# Patient Record
Sex: FEMALE | Race: BLACK OR AFRICAN AMERICAN | NOT HISPANIC OR LATINO | Employment: FULL TIME | URBAN - METROPOLITAN AREA
[De-identification: names, ages, dates, MRNs, and addresses within clinical notes are randomized per-mention and may not be internally consistent; named-entity substitution may affect disease eponyms.]

---

## 2017-06-19 ENCOUNTER — OFFICE VISIT (OUTPATIENT)
Dept: OBSTETRICS AND GYNECOLOGY | Facility: CLINIC | Age: 42
End: 2017-06-19
Payer: MEDICAID

## 2017-06-19 VITALS
BODY MASS INDEX: 29.83 KG/M2 | SYSTOLIC BLOOD PRESSURE: 112 MMHG | HEIGHT: 67 IN | DIASTOLIC BLOOD PRESSURE: 70 MMHG | WEIGHT: 190.06 LBS

## 2017-06-19 DIAGNOSIS — N96 HISTORY OF MULTIPLE SPONTANEOUS ABORTIONS: Primary | ICD-10-CM

## 2017-06-19 PROCEDURE — 99999 PR PBB SHADOW E&M-EST. PATIENT-LVL II: CPT | Mod: PBBFAC,,, | Performed by: OBSTETRICS & GYNECOLOGY

## 2017-06-19 PROCEDURE — 99212 OFFICE O/P EST SF 10 MIN: CPT | Mod: PBBFAC,PO | Performed by: OBSTETRICS & GYNECOLOGY

## 2017-06-19 PROCEDURE — 99203 OFFICE O/P NEW LOW 30 MIN: CPT | Mod: S$PBB,,, | Performed by: OBSTETRICS & GYNECOLOGY

## 2017-06-19 RX ORDER — ASPIRIN 81 MG/1
81 TABLET ORAL DAILY
COMMUNITY
End: 2017-10-10

## 2017-06-20 NOTE — PROGRESS NOTES
"42 y.o.   OB History      Para Term  AB Living    7 3 3       SAB TAB Ectopic Multiple Live Births                Comlaining of: pt here to establish care and discuss frequent miss  Had 3 children by CHERRY schofield, then 4 miss, had 2 Dnc, 2 spont, no reason, told karyotype was normal    Hx of reg cycles,    PMH neg  PSH C secton/DnC  No chronic problems  Neg gyn hx, had us with miss      ROS:  GENERAL: No fever, chills, fatigability or weight loss.  SKIN: No rashes, itching or changes in color or texture of skin.  HEAD: No headaches or recent head trauma.  EYES: Visual acuity fine. No photophobia, ocular pain or diplopia.  EARS: Denies ear pain, discharge or vertigo.  NOSE: No loss of smell, no epistaxis or postnasal drip.  MOUTH & THROAT: No hoarseness or change in voice. No excessive gum bleeding.  NODES: Denies swollen glands.  CHEST: Denies GROVER, cyanosis, wheezing, cough and sputum production.  CARDIOVASCULAR: Denies chest pain, PND, orthopnea or reduced exercise tolerance.  ABDOMEN: Appetite fine. No weight loss. Denies diarrhea, abdominal pain, hematemesis or blood in stool.  URINARY: No flank pain, dysuria or hematuria.  PERIPHERAL VASCULAR: No claudication or cyanosis.  MUSCULOSKELETAL: No joint stiffness or swelling. Denies back pain.  NEUROLOGIC: No history of seizures, paralysis, alteration of gait or coordination      PE: /70   Ht 5' 7" (1.702 m)   Wt 86.2 kg (190 lb 0.6 oz)   BMI 29.76 kg/m²    Exam def    ass' frequent miss  Plan, will draw labs on day 21 this cycle, approx this Friday  Discussed in depth causes/labs, etc  Increased risk for problems being 42 but should be able to conceive          "

## 2017-06-22 ENCOUNTER — TELEPHONE (OUTPATIENT)
Dept: OBSTETRICS AND GYNECOLOGY | Facility: CLINIC | Age: 42
End: 2017-06-22

## 2017-06-22 DIAGNOSIS — N96 HABITUAL ABORTER: Primary | ICD-10-CM

## 2017-06-23 ENCOUNTER — LAB VISIT (OUTPATIENT)
Dept: LAB | Facility: HOSPITAL | Age: 42
End: 2017-06-23
Attending: OBSTETRICS & GYNECOLOGY
Payer: MEDICAID

## 2017-06-23 DIAGNOSIS — N96 HABITUAL ABORTER: ICD-10-CM

## 2017-06-23 LAB
FSH SERPL-ACNC: 3.3 MIU/ML
PROGEST SERPL-MCNC: 11.2 NG/ML
TSH SERPL DL<=0.005 MIU/L-ACNC: 1.92 UIU/ML

## 2017-06-23 PROCEDURE — 84443 ASSAY THYROID STIM HORMONE: CPT

## 2017-06-23 PROCEDURE — 86147 CARDIOLIPIN ANTIBODY EA IG: CPT | Mod: 59

## 2017-06-23 PROCEDURE — 84144 ASSAY OF PROGESTERONE: CPT

## 2017-06-23 PROCEDURE — 36415 COLL VENOUS BLD VENIPUNCTURE: CPT

## 2017-06-23 PROCEDURE — 85305 CLOT INHIBIT PROT S TOTAL: CPT

## 2017-06-23 PROCEDURE — 86038 ANTINUCLEAR ANTIBODIES: CPT

## 2017-06-23 PROCEDURE — 83001 ASSAY OF GONADOTROPIN (FSH): CPT

## 2017-06-26 LAB — ANA SER QL IF: NORMAL

## 2017-06-27 LAB
CARDIOLIPIN IGG SER IA-ACNC: <9.4 GPL
CARDIOLIPIN IGM SER IA-ACNC: 16.95 MPL

## 2017-06-29 LAB — PROT S ACT/NOR PPP: 73 %

## 2017-07-31 ENCOUNTER — TELEPHONE (OUTPATIENT)
Dept: OBSTETRICS AND GYNECOLOGY | Facility: CLINIC | Age: 42
End: 2017-07-31

## 2017-07-31 DIAGNOSIS — N96 HABITUAL ABORTER NOT CURRENTLY PREGNANT: Primary | ICD-10-CM

## 2017-07-31 NOTE — TELEPHONE ENCOUNTER
----- Message from Michael A. Wiedemann, MD sent at 7/28/2017  6:03 PM CDT -----  She works on post partum, nurse aid,  She has had 4 misscarriages in a row. (has 3 prev children)  Anyway, I did some labs and all are ok, I spoke to her about it.  Can you set her up with one of the [facility] peeps to discuss other testing??  Maybe see who likes this..Hannah??   And she can make appt,    thanks

## 2017-08-22 ENCOUNTER — INITIAL CONSULT (OUTPATIENT)
Dept: MATERNAL FETAL MEDICINE | Facility: CLINIC | Age: 42
End: 2017-08-22
Attending: OBSTETRICS & GYNECOLOGY
Payer: COMMERCIAL

## 2017-08-22 DIAGNOSIS — N96 RECURRENT PREGNANCY LOSS WITHOUT CURRENT PREGNANCY: Primary | ICD-10-CM

## 2017-08-22 PROCEDURE — 99242 OFF/OP CONSLTJ NEW/EST SF 20: CPT | Mod: S$GLB,,, | Performed by: OBSTETRICS & GYNECOLOGY

## 2017-08-22 PROCEDURE — 99999 PR PBB SHADOW E&M-EST. PATIENT-LVL I: CPT | Mod: PBBFAC,,, | Performed by: OBSTETRICS & GYNECOLOGY

## 2017-08-22 NOTE — LETTER
August 25, 2017      Michael A. Wiedemann, MD  [address] [place] [address]           [facility]  [address]  [facility] [address]  Phone: [phone]          Patient: Gladis Sesay   MR Number: [id number]   YOB: 1975   Date of Visit: 8/22/2017       Dear Dr. Michael A. Wiedemann:    Thank you for referring Gladis Sesay to me for evaluation. Attached you will find relevant portions of my assessment and plan of care.    If you have questions, please do not hesitate to call me. I look forward to following Gladis Sesay along with you.    Sincerely,    Melissa Morales  CC:  No Recipients    If you would like to receive this communication electronically, please contact [email or web address] or [phone] to request more information on [facility] [employer] access.    For providers and/or their staff who would like to refer a patient to [facility], please contact us through our one-stop-shop provider referral line, [facility], at [phone].    If you feel you have received this communication in error or would no longer like to receive these types of communications, please e-mail [email or web address]

## 2017-08-29 NOTE — PROGRESS NOTES
Ms. Nikki Sesay is a 41y/o  who presents for preconception counseling due to a history of recurrent pregnancy loss.    PMHx: Glaucoma-since age 2-well controlled-father's side has              She reported clotting easily had a negative workup through her PCP    PSHx: D and C             Csectionx3    Meds: None, not taking PNV (not currently sexually active), not taking baby ASA    NKDA    Family history: glaucoma, no history of birth defects, no history of sickle cell disease    POBHx: 2004-Term csection 7 pounds 10 ounces                2008-Term csection  10 pounds                2011-Term csection 8 pounds                2013-8 weeks D and C, did see fetus without cardiac activity                -blighted ovum, no D and C                -ultrasound gestational sac/no pole-patient reports passed pregnancy quickly                2017-no cardiac activity, 7-8 weeks-D and C-concern was molar pregnancy per patient but reportedly chromosomes were normal although no records                No reported complications with term deliveries    Social: Denies smoking, alcohol, illicit drug use    TRESA negative, ACLA IgM indeterminate, Protein S activity normal, TSH 1.918    /84    1. Recurrent pregnancy loss:     The patient was counseled about the frequency of misscarriage.  The majority of cases of first trimester miscarriages are the result of genetic problems or fetal aneuploidy in the pregnancy.  Other etiologies may include congenital anomalies of the fetus, infection, bleeding, abruption, thrombophilias or uterine anomalies.         The patient's anticardiolipin antibodies are indeterminate and technically do not meet the criteria for APLS but she has not had the full workup. I would recommend rechecking the ACLA IgG and IgM approximately 12 weeks from the last draw to see if there are any changes. I would also recommend checking lupus anticoagulant and beta 2 glycoprotein IgG and IgM  antibodies. I discussed with the patient the option of maternal and paternal karyotype to assess for balanced translocations which she will consider and these could be drawn at the time of her other lab studies. I would also recommend screening the patient for diabetes (2 hour GTT could be used and hemoglobin A1c). Some individuals also suggested checking thyroglobulin antibodies and thyroid peroxidase antibodies. She did not report a history of GDM but did have a large baby in the past. To assess for uterine anomalies particularly after multiple csections and D and C recommend checking an HSG/Sonohysterogram. It is possible that women can develop scar tissue/Asherman syndrome as well. I would also check prolactin.   It may be beneficial if these studies do not elucidate an etiology for the patient to see a [facility] physician as some individuals have luteal phase defects etc.    I did discuss with the patient the increased frequency of miscarriage with advancing age and the increased risk of aneuploidy. We do not have information on the products of conception from prior pregnancies. I would recommend that you obtain the records from her last pregnancy loss to be sure there are no concerns.    Given her borderline ACLA, I would at least recommend a daily baby aspirin 81mg.    You should also confirm the patient's HIV, rubella, and varicella status and give precautions regarding fish and alcohol consumption, as well as appropriate exercise and weight.    If conception is being attempted recommend prenatal vitamin with appropriate folic acid supplementation.    2. AMA      We discussed that AMA increases the risk of aneuploidy. We discussed if conception occurs the options of sequential screening and NIPT. We also discussed diagnostic testing via       CVS and amniocentesis. We discussed the increased risk of adverse outcomes with AMA including but not limited to diabetes, hypertension, miscarriage and fetal demise.  Detailed       Anatomic survey is recommended at 20 weeks, as is a growth ultrasound at 32 weeks.    3. Multiple csections and uterine procedures increase the risk of abnormal placentation such as placenta accreta etc. Additionally, there can be increased scar tissue with increased procedures.    4. The patient has a strong family history of glaucoma and based on what she reports it sounds dominant. She is aware of the potential for her children to be affected and her children are being followed.    5. Recommend hemoglobin electrophoresis.    6. If these studies do not elucidate an etiology, some individuals pursue additional screening with advanced carrier screening when contemplating pregnancy, but this may not elucidate an etiology either.    I have not scheduled the patient for a return visit. She is uncertain if she will pursue further pregnancies. However, if you obtain these studies we would be happy to see the patient in follow up to review the results and discuss any further recommendations.    A total of 30 minutes was spent in face to face time with greater than half of that time spent in counseling and coordination of care.

## 2017-09-19 ENCOUNTER — TELEPHONE (OUTPATIENT)
Dept: OBSTETRICS AND GYNECOLOGY | Facility: CLINIC | Age: 42
End: 2017-09-19

## 2017-09-19 NOTE — TELEPHONE ENCOUNTER
----- Message from Kimimicah Gore sent at 9/19/2017 10:52 AM CDT -----  Contact: [phone] / self   Patient is currently scheduled on 10/10/17 but is requesting something sooner. States she went to see the doctor at [facility] and has done all the other instructions given to her by Dr. Wiedemann and now wants to follow up with him. Please advise.

## 2017-09-20 NOTE — TELEPHONE ENCOUNTER
I see the high risk doctor ordered more labs,,i can' order those and see first if she wants?  Does she need a pap /annual exam?

## 2017-09-20 NOTE — TELEPHONE ENCOUNTER
Pt states she had a pap in June. She also said she wanted to discuss the labs that were ordered with you because she doesn't know why they were ordered. She has 3 children already and doesn't know why the genetic tests were ordered. She has 3 healthy children and all of a sudden she had miscarriages

## 2017-09-27 NOTE — TELEPHONE ENCOUNTER
The tests were ordered because she wanted to find out if there was something wrong as to why she had so many misscarriages.   ??

## 2017-10-10 ENCOUNTER — OFFICE VISIT (OUTPATIENT)
Dept: OBSTETRICS AND GYNECOLOGY | Facility: CLINIC | Age: 42
End: 2017-10-10
Payer: COMMERCIAL

## 2017-10-10 VITALS
WEIGHT: 183 LBS | BODY MASS INDEX: 28.72 KG/M2 | SYSTOLIC BLOOD PRESSURE: 106 MMHG | HEIGHT: 67 IN | DIASTOLIC BLOOD PRESSURE: 74 MMHG

## 2017-10-10 DIAGNOSIS — Z30.09 GENERAL COUNSELING AND ADVICE ON FEMALE CONTRACEPTION: Primary | ICD-10-CM

## 2017-10-10 PROCEDURE — 99212 OFFICE O/P EST SF 10 MIN: CPT | Mod: S$GLB,,, | Performed by: OBSTETRICS & GYNECOLOGY

## 2017-10-10 PROCEDURE — 99999 PR PBB SHADOW E&M-EST. PATIENT-LVL II: CPT | Mod: PBBFAC,,, | Performed by: OBSTETRICS & GYNECOLOGY

## 2017-10-10 NOTE — PROGRESS NOTES
"42 y.o.   OB History      Para Term  AB Living    7 3 3   4 3    SAB TAB Ectopic Multiple Live Births    4       3        Comlaining of: pt here for fu of prev miss x 4 and labs  Not on ocp  Cycles regular, saw MFM also      ROS:  GENERAL: No fever, chills, fatigability or weight loss.  SKIN: No rashes, itching or changes in color or texture of skin.  HEAD: No headaches or recent head trauma.  EYES: Visual acuity fine. No photophobia, ocular pain or diplopia.  EARS: Denies ear pain, discharge or vertigo.  NOSE: No loss of smell, no epistaxis or postnasal drip.  MOUTH & THROAT: No hoarseness or change in voice. No excessive gum bleeding.  NODES: Denies swollen glands.  CHEST: Denies GROVER, cyanosis, wheezing, cough and sputum production.  CARDIOVASCULAR: Denies chest pain, PND, orthopnea or reduced exercise tolerance.  ABDOMEN: Appetite fine. No weight loss. Denies diarrhea, abdominal pain, hematemesis or blood in stool.  URINARY: No flank pain, dysuria or hematuria.  PERIPHERAL VASCULAR: No claudication or cyanosis.  MUSCULOSKELETAL: No joint stiffness or swelling. Denies back pain.  NEUROLOGIC: No history of seizures, paralysis, alteration of gait or coordination      PE: /74   Ht 5' 7" (1.702 m)   Wt 83 kg (182 lb 15.7 oz)   LMP 2017 (Exact Date)   BMI 28.66 kg/m²      Exam def  Discussed options of cont testing, vs observation  Pt graduating school Dec, then wants to cont educationswhile working  Has come to conclusioin of getting on birth control for now  If decides to get pregnant, will contact about doing more labs possibley  Discussed age factor    Pt wants to start ocp  Rx given for lolo with coupon  Fu for pap or prn        "

## 2017-10-16 RX ORDER — NORETHINDRONE ACETATE AND ETHINYL ESTRADIOL .02; 1 MG/1; MG/1
1 TABLET ORAL DAILY
Qty: 30 TABLET | Refills: 11 | Status: SHIPPED | OUTPATIENT
Start: 2017-10-16 | End: 2018-11-12

## 2017-10-16 NOTE — TELEPHONE ENCOUNTER
----- Message from Zahra Morales sent at 10/16/2017  4:02 PM CDT -----  Contact: Self/ [phone]  Patient would like to speak with you about getting a medication change. Please advise.

## 2017-11-10 ENCOUNTER — OFFICE VISIT (OUTPATIENT)
Dept: URGENT CARE | Facility: CLINIC | Age: 42
End: 2017-11-10
Payer: COMMERCIAL

## 2017-11-10 ENCOUNTER — HOSPITAL ENCOUNTER (EMERGENCY)
Facility: HOSPITAL | Age: 42
Discharge: HOME | End: 2017-11-10
Attending: EMERGENCY MEDICINE
Payer: COMMERCIAL

## 2017-11-10 VITALS
DIASTOLIC BLOOD PRESSURE: 84 MMHG | OXYGEN SATURATION: 100 % | HEART RATE: 70 BPM | WEIGHT: 182 LBS | TEMPERATURE: 98 F | BODY MASS INDEX: 28.56 KG/M2 | RESPIRATION RATE: 18 BRPM | SYSTOLIC BLOOD PRESSURE: 138 MMHG | HEIGHT: 67 IN

## 2017-11-10 VITALS
DIASTOLIC BLOOD PRESSURE: 59 MMHG | RESPIRATION RATE: 18 BRPM | WEIGHT: 182 LBS | OXYGEN SATURATION: 100 % | SYSTOLIC BLOOD PRESSURE: 128 MMHG | HEART RATE: 85 BPM | HEIGHT: 67 IN | BODY MASS INDEX: 28.56 KG/M2

## 2017-11-10 DIAGNOSIS — J39.2 SPASM OF THROAT: Primary | ICD-10-CM

## 2017-11-10 DIAGNOSIS — J98.01 BRONCHOSPASM: ICD-10-CM

## 2017-11-10 DIAGNOSIS — R06.00 DYSPNEA, UNSPECIFIED TYPE: Primary | ICD-10-CM

## 2017-11-10 DIAGNOSIS — R52 PAIN: ICD-10-CM

## 2017-11-10 DIAGNOSIS — R06.02 SHORTNESS OF BREATH: ICD-10-CM

## 2017-11-10 LAB
B-HCG UR QL: NEGATIVE
CTP QC/QA: YES

## 2017-11-10 PROCEDURE — 99284 EMERGENCY DEPT VISIT MOD MDM: CPT | Mod: 25

## 2017-11-10 PROCEDURE — 81025 URINE PREGNANCY TEST: CPT | Performed by: PHYSICIAN ASSISTANT

## 2017-11-10 PROCEDURE — 99214 OFFICE O/P EST MOD 30 MIN: CPT | Mod: S$GLB,,, | Performed by: NURSE PRACTITIONER

## 2017-11-10 PROCEDURE — 96372 THER/PROPH/DIAG INJ SC/IM: CPT

## 2017-11-10 PROCEDURE — 63600175 PHARM REV CODE 636 W HCPCS: Performed by: PHYSICIAN ASSISTANT

## 2017-11-10 RX ORDER — DEXAMETHASONE SODIUM PHOSPHATE 4 MG/ML
8 INJECTION, SOLUTION INTRA-ARTICULAR; INTRALESIONAL; INTRAMUSCULAR; INTRAVENOUS; SOFT TISSUE
Status: COMPLETED | OUTPATIENT
Start: 2017-11-10 | End: 2017-11-10

## 2017-11-10 RX ADMIN — DEXAMETHASONE SODIUM PHOSPHATE 8 MG: 4 INJECTION, SOLUTION INTRAMUSCULAR; INTRAVENOUS at 08:11

## 2017-11-10 NOTE — PROGRESS NOTES
"Subjective:       Patient ID: Gladis Sesay is a 42 y.o. female.    Vitals:  height is 5' 7" (1.702 m) and weight is 82.6 kg (182 lb). Her temperature is 97.8 °F (36.6 °C). Her blood pressure is 138/84 and her pulse is 70. Her respiration is 18 and oxygen saturation is 100%.     Chief Complaint: Shortness of Breath    Shortness of breath and bronchospasm after drinking coffee this morning. No SOB at current but reports dry mouth. Denies fever, cough, chest pain.      Shortness of Breath   This is a new problem. The current episode started today. Episode frequency: 4 times today. The problem has been waxing and waning. Pertinent negatives include no abdominal pain, chest pain, ear pain, fever, headaches, leg swelling, sore throat, sputum production or wheezing. Nothing aggravates the symptoms. Risk factors: none. She has tried nothing for the symptoms. The treatment provided no relief. There is no history of asthma, bronchiolitis, COPD or pneumonia.     Review of Systems   Constitution: Negative for chills, fever and malaise/fatigue.   HENT: Negative for congestion, ear pain, hoarse voice and sore throat.    Eyes: Negative for discharge and redness.   Cardiovascular: Negative for chest pain, dyspnea on exertion and leg swelling.   Respiratory: Positive for shortness of breath. Negative for cough, sputum production and wheezing.    Musculoskeletal: Negative for myalgias.   Gastrointestinal: Negative for abdominal pain and nausea.   Neurological: Negative for headaches.   All other systems reviewed and are negative.      Objective:      Physical Exam   Constitutional: She is oriented to person, place, and time. She appears well-developed and well-nourished.   HENT:   Head: Normocephalic and atraumatic.   Right Ear: Hearing, tympanic membrane, external ear and ear canal normal. Tympanic membrane is not erythematous. No decreased hearing is noted.   Left Ear: Hearing, tympanic membrane, external ear and ear " canal normal. Tympanic membrane is not erythematous. No decreased hearing is noted.   Nose: Nose normal. No mucosal edema or rhinorrhea. Right sinus exhibits no maxillary sinus tenderness and no frontal sinus tenderness. Left sinus exhibits no maxillary sinus tenderness and no frontal sinus tenderness.   Mouth/Throat: Uvula is midline, oropharynx is clear and moist and mucous membranes are normal. No oropharyngeal exudate, posterior oropharyngeal edema or posterior oropharyngeal erythema.   Eyes: Conjunctivae, EOM and lids are normal.   Neck: Trachea normal, normal range of motion and full passive range of motion without pain. Neck supple.   Cardiovascular: Normal rate, regular rhythm, S1 normal, S2 normal and normal heart sounds.    Pulmonary/Chest: Effort normal and breath sounds normal. No respiratory distress.   Abdominal: Soft. Normal appearance.   Neurological: She is alert and oriented to person, place, and time. She has normal strength. No cranial nerve deficit or sensory deficit. GCS eye subscore is 4. GCS verbal subscore is 5. GCS motor subscore is 6.   Skin: Skin is warm and dry. No rash noted.   Nursing note and vitals reviewed.      Assessment:       1. Dyspnea, unspecified type    2. Shortness of breath    3. Bronchospasm        Plan:         Dyspnea, unspecified type    Shortness of breath  -     Refer to Emergency Dept.    Bronchospasm        Patient declined chest xray in clinic. Referred to ER for further work up

## 2017-11-11 NOTE — ED PROVIDER NOTES
Encounter Date: 11/10/2017       History     Chief Complaint   Patient presents with    throat spasm?     pt sent here from urgent care for throat spasm, denies sore throat or fever, no sob. spasm starts , pt feels like cant breathe then it releases and everything is fine, started occurring at 10am     42-year-old female with  past medical history glaucoma presents to the ED for evaluation of reported throat spasm.  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  She states that she typically only has decaf coffee however today drank regular coffee this morning.  She states several minutes following this she had what felt like a throat spasm.  She states that this did start all her and that it made her quite anxious and that she had some secondary hyperventilation and shortness of breath that resolved after concentrating on deep breathing.  Denies any prolonged spasm and states that she had approximately 4 more episodes throughout the day similar to the first one.  She does report that she is tolerating liquids and solids by mouth and that she ate something shortly before arrival to the ED.  She does complain of some associated dry mouth. She did go to an urgent care just prior to arrival and was told they wanted to do a CAT scan of the neck and patient did not feel comfortable with this and elected to leave.  I do see a note from urgent care sending patient to ED for further evaluation of a PE.  Patient denies any chest pain, shortness of breath, palpitations, hemoptysis, known malignancy, lower leg edema or prolonged travel or trauma.       The history is provided by the patient.     Review of patient's allergies indicates:  No Known Allergies  Past Medical History:   Diagnosis Date    Glaucoma      Past Surgical History:   Procedure Laterality Date     SECTION      DILATION AND CURETTAGE OF UTERUS       History reviewed. No pertinent family  history.  Social History   Substance Use Topics    Smoking status: Never Smoker    Smokeless tobacco: Never Used    Alcohol use No     Review of Systems   Constitutional: Negative for chills and fever.   HENT: Positive for trouble swallowing and voice change. Negative for congestion, postnasal drip, rhinorrhea, sinus pain and sore throat.    Respiratory: Positive for shortness of breath (Episodic and denies presently). Negative for cough, choking, chest tightness, wheezing and stridor.    Cardiovascular: Negative for chest pain, palpitations and leg swelling.   Gastrointestinal: Negative for nausea.   Genitourinary: Negative for dysuria.   Musculoskeletal: Negative for back pain.   Skin: Negative for rash.   Neurological: Negative for dizziness, syncope, weakness, light-headedness and headaches.   Hematological: Does not bruise/bleed easily.   Psychiatric/Behavioral: The patient is nervous/anxious.        Physical Exam     Initial Vitals [11/10/17 1825]   BP Pulse Resp Temp SpO2   (!) 146/72 61 18 -- 100 %      MAP       96.67         Physical Exam    Nursing note and vitals reviewed.  Constitutional: Vital signs are normal. She appears well-developed and well-nourished. She is cooperative.  Non-toxic appearance. She does not appear ill. No distress.   HENT:   Head: Normocephalic and atraumatic.   Nose: Nose normal.   Mouth/Throat: Uvula is midline. Mucous membranes are dry. No trismus in the jaw. No uvula swelling. No posterior oropharyngeal edema, posterior oropharyngeal erythema or tonsillar abscesses.   Neck is supple with no tracheal deviation, stridor or signs of compromised airway.  She does have noted hoarse voice however no hot potato voice, trismus and is tolerating secretions   Eyes: Conjunctivae and lids are normal.   Neck: Neck supple. No stridor present. No neck rigidity.   Cardiovascular: Normal rate and regular rhythm.   Pulmonary/Chest: Breath sounds normal. No respiratory distress. She has no  wheezes. She has no rhonchi.   Abdominal: Soft. Normal appearance and bowel sounds are normal. There is no tenderness. There is no rigidity and no guarding.   Musculoskeletal: Normal range of motion.   Neurological: She is alert and oriented to person, place, and time. GCS eye subscore is 4. GCS verbal subscore is 5. GCS motor subscore is 6.   Skin: Skin is warm, dry and intact. No rash noted.   Psychiatric: Her speech is normal and behavior is normal. Thought content normal. Her mood appears anxious.         ED Course   Procedures  Labs Reviewed - No data to display      Imaging Results          X-Ray Neck Soft Tissue (Final result)  Result time 11/10/17 20:02:56    Final result by Dov Arthur MD (11/10/17 20:02:56)                 Impression:     No significant abnormality      Electronically signed by: DOV ARTHUR MD  Date:     11/10/17  Time:    20:02              Narrative:    Neck AP and lateral soft tissue views. No prior exam.    The outline of upper airway is unremarkable. No swelling of the epiglottis is identified. The prevertebral soft tissues are normal in width. No foreign body is detected. Cervical vertebra are intact.                                   Medical Decision Making:   Initial Assessment:   42-year-old well-appearing female presents to the ED for evaluation of episodic throat spasms that began today.  Patient states that she did have caffeinated coffee which is atypical for her but denies any other new medication are environmental exposures.  She states that the sensation is brief and typically resolves however it does cause her some anxiety immediately following.  Scribe some shortness of breath associated with the anxious episodes however denies any presently, denies any dyspnea on exertion or NPD.  She has not tried any medication for the symptoms.  Physical exam reveals well-appearing female in no obvious distress although should she does appear mildly anxious when she  discusses the spasms. Neck is supple with no tracheal deviation, stridor or signs of compromised airway.  Differential Diagnosis:   Epiglottitis, laryngitis, pharyngitis, cricoid abnormality, stress reaction  Clinical Tests:   Radiological Study: Ordered and Reviewed  ED Management:  Patient has stable vitals and appears in no obvious distress with no signs of airway compromise or respiratory distress on physical exam.  A soft tissue neck x-ray reveals no abnormalities.  I do note that patient was sent to the ED for further evaluation or PE rule out however patient is a Wells urticaria of 0 and has no complaints of shortness of breath at this time with stable vitals.  I do feel the episodic shortness of breath was related to the anxiety patient was experiencing after reported throat spasms.  Patient's mucous membranes are noted to be dry however she is tolerating secretions, food and liquids by mouth and demonstrated successful by mouth challenge in the ED.  Patient was instructed to increase oral hydration. Unclear etiology of patient's symptoms however it does not appear to be an emergent process at this time. Decadron was ordered to help reduce any inflammation.  Patient was instructed to follow-up with ear nose and throat doctor.  Resources provided to patient. Patient was cautioned on when to return to ED. Patient verbalized understanding and agreement with the treatment plan.      Additional MDM:     Well's Criteria Score:  -Clinical symptoms of DVT (leg swelling, pain with palpation) = 0.0  -Other diagnosis less likely than pulmonary embolism =            0.0  -Heart Rate >100 =   0.0  -Previous DVT/PE = 0.0  -Hemoptysis =          0.0  -Malignancy =           0.0                        ED Course      Clinical Impression:   The primary encounter diagnosis was Spasm of throat. A diagnosis of Pain was also pertinent to this visit.                           GARY Montenegro  11/10/17 2037

## 2017-11-11 NOTE — ED NOTES
Patient has verified the spelling of their name and  on armband  LOC: The patient is awake, alert, and aware of environment with an appropriate affect, the patient is oriented x 4 and speaking appropriately, voice hoarse.   APPEARANCE: Patient resting comfortably and in no acute distress, patient is clean and well groomed, patient's clothing is properly fastened.   SKIN: The skin is warm and dry, color consistent with ethnicity, patient has normal skin turgor and moist mucus membranes, skin intact, no breakdown or bruising noted.   : Voids without difficulty  MUSCULOSKELETAL: Patient moving all extremities spontaneously, no obvious swelling or deformities noted.   RESPIRATORY: Airway is open and patent, respirations are spontaneous, patient has a normal effort and rate, no accessory muscle use noted, bilateral breath sounds clear, complaints of  SOB , )2 sats at 100% room air, HR 67, complaints of dry throat  ABDOMEN: Soft and non tender to palpation, no distention noted, normoactive bowel sounds present in all four quadrants.   CARDIAC: Normal rate and rhythm, no peripheral edema noted, less then 3 second capillary refill, denies chest pain  COMPLAINT: throat spasm on today after drinking coffee, pt states it felt like her throat spasm and had problem catching her breath

## 2018-07-31 ENCOUNTER — OFFICE VISIT (OUTPATIENT)
Dept: OBSTETRICS AND GYNECOLOGY | Facility: CLINIC | Age: 43
End: 2018-07-31
Payer: COMMERCIAL

## 2018-07-31 VITALS
BODY MASS INDEX: 27.27 KG/M2 | WEIGHT: 173.75 LBS | HEIGHT: 67 IN | SYSTOLIC BLOOD PRESSURE: 108 MMHG | DIASTOLIC BLOOD PRESSURE: 72 MMHG

## 2018-07-31 DIAGNOSIS — Z12.31 SCREENING MAMMOGRAM, ENCOUNTER FOR: ICD-10-CM

## 2018-07-31 DIAGNOSIS — Z12.4 ROUTINE PAPANICOLAOU SMEAR: ICD-10-CM

## 2018-07-31 DIAGNOSIS — Z01.419 WELL WOMAN EXAM WITH ROUTINE GYNECOLOGICAL EXAM: Primary | ICD-10-CM

## 2018-07-31 PROCEDURE — 88141 CYTOPATH C/V INTERPRET: CPT | Mod: ,,, | Performed by: PATHOLOGY

## 2018-07-31 PROCEDURE — 99999 PR PBB SHADOW E&M-EST. PATIENT-LVL III: CPT | Mod: PBBFAC,,, | Performed by: OBSTETRICS & GYNECOLOGY

## 2018-07-31 PROCEDURE — 88175 CYTOPATH C/V AUTO FLUID REDO: CPT | Performed by: PATHOLOGY

## 2018-07-31 PROCEDURE — 99396 PREV VISIT EST AGE 40-64: CPT | Mod: S$GLB,,, | Performed by: OBSTETRICS & GYNECOLOGY

## 2018-07-31 RX ORDER — DROSPIRENONE AND ETHINYL ESTRADIOL 0.02-3(28)
1 KIT ORAL DAILY
Qty: 30 TABLET | Refills: 11 | Status: SHIPPED | OUTPATIENT
Start: 2018-07-31 | End: 2018-11-12

## 2018-07-31 NOTE — PROGRESS NOTES
"HPI:   43 y.o.   OB History      Para Term  AB Living    7 3 3   4 3    SAB TAB Ectopic Multiple Live Births    4       3       Patient's last menstrual period was 2018 (approximate).    Patient is  here for her annual gynecologic exam.  She has no complaints.     ROS:  GENERAL: No fever, chills, fatigability or weight loss.  SKIN: No rashes, itching or changes in color or texture of skin.  HEAD: No headaches or recent head trauma.  EYES: Visual acuity fine. No photophobia, ocular pain or diplopia.  EARS: Denies ear pain, discharge or vertigo.  NOSE: No loss of smell, no epistaxis or postnasal drip.  MOUTH & THROAT: No hoarseness or change in voice. No excessive gum bleeding.  NODES: Denies swollen glands.  CHEST: Denies GROVER, cyanosis, wheezing, cough and sputum production.  CARDIOVASCULAR: Denies chest pain, PND, orthopnea or reduced exercise tolerance.  ABDOMEN: Appetite fine. No weight loss. Denies diarrhea, abdominal pain, hematemesis or blood in stool.  URINARY: No flank pain, dysuria or hematuria.  PERIPHERAL VASCULAR: No claudication or cyanosis.  MUSCULOSKELETAL: No joint stiffness or swelling. Denies back pain.  NEUROLOGIC: No history of seizures, paralysis, alteration of gait or coordination.    PE:   /72   Ht 5' 7" (1.702 m)   Wt 78.8 kg (173 lb 11.6 oz)   LMP 2018 (Approximate)   BMI 27.21 kg/m²   APPEARANCE: Well nourished, well developed, in no acute distress.  NECK: Neck symmetric without masses or thyromegaly.  BREASTS: Symmetrical, no skin changes or visible lesions. No palpable masses, nipple discharge or adenopathy bilaterally.  ABDOMEN: Flat. Soft. No tenderness or masses. No hepatosplenomegaly. No hernias. No CVA tenderness.  VULVA: No lesions. Normal female genitalia.  URETHRAL MEATUS: Normal size and location, no lesions, no prolapse.  URETHRA: No masses, tenderness, prolapse or scarring.  VAGINA: Moist and well rugated, no discharge, no significant " cystocele or rectocele.  CERVIX: No lesions and discharge. PAP done.  UTERUS: Normal size, regular shape, mobile, non-tender, bladder base nontender.  ADNEXA: No masses, tenderness or CDS nodularity.  ANUS PERINEUM: Normal.    PROCEDURES:  Pap smear    Assessment:  Normal Gynecologic Exam    Plan:  Mammogram and Colonoscopy if indicated by current recommendations.  Return to clinic in one year or for any problems or complaints.  Change pill for increase facial hair

## 2018-08-06 ENCOUNTER — TELEPHONE (OUTPATIENT)
Dept: OBSTETRICS AND GYNECOLOGY | Facility: CLINIC | Age: 43
End: 2018-08-06

## 2018-08-06 NOTE — TELEPHONE ENCOUNTER
----- Message from Shekhar Steward sent at 8/6/2018  2:02 PM CDT -----  Contact: [phone]  Patient advised her insurance will not cover the Rx for the Loryna. Please call and advise.

## 2018-08-07 RX ORDER — LEVONORGESTREL AND ETHINYL ESTRADIOL 0.1-0.02MG
1 KIT ORAL DAILY
Qty: 28 TABLET | Refills: 11 | Status: SHIPPED | OUTPATIENT
Start: 2018-08-07 | End: 2018-11-12

## 2018-08-09 ENCOUNTER — HOSPITAL ENCOUNTER (OUTPATIENT)
Dept: RADIOLOGY | Facility: HOSPITAL | Age: 43
Discharge: HOME | End: 2018-08-09
Attending: OBSTETRICS & GYNECOLOGY
Payer: COMMERCIAL

## 2018-08-09 DIAGNOSIS — Z12.31 SCREENING MAMMOGRAM, ENCOUNTER FOR: ICD-10-CM

## 2018-08-09 PROCEDURE — 77067 SCR MAMMO BI INCL CAD: CPT | Mod: TC

## 2018-08-09 PROCEDURE — 77063 BREAST TOMOSYNTHESIS BI: CPT | Mod: 26,,, | Performed by: RADIOLOGY

## 2018-08-09 PROCEDURE — 77067 SCR MAMMO BI INCL CAD: CPT | Mod: 26,,, | Performed by: RADIOLOGY

## 2018-08-22 ENCOUNTER — TELEPHONE (OUTPATIENT)
Dept: OBSTETRICS AND GYNECOLOGY | Facility: CLINIC | Age: 43
End: 2018-08-22

## 2018-08-22 NOTE — TELEPHONE ENCOUNTER
----- Message from Shekhar Steward sent at 8/21/2018  4:26 PM CDT -----  Contact: [phone]  Patient advised her insurance will not cover the levonorgestrel-ethinyl estradiol (AVIANE,DARREN,LESSINA) 0.1-20 mg-mcg per tablet and need a new birth control. Please call.

## 2018-08-22 NOTE — TELEPHONE ENCOUNTER
Left message to return call  Pt needs to call her insurance company and find out which birth control pills are covered.

## 2018-09-06 ENCOUNTER — TELEPHONE (OUTPATIENT)
Dept: OBSTETRICS AND GYNECOLOGY | Facility: CLINIC | Age: 43
End: 2018-09-06

## 2018-09-06 NOTE — TELEPHONE ENCOUNTER
----- Message from Elsy Dnun sent at 9/6/2018  7:56 AM CDT -----  Contact: [phone]/self  Patient called in returning your call. Please advise.

## 2018-10-08 ENCOUNTER — LAB VISIT (OUTPATIENT)
Dept: LAB | Facility: HOSPITAL | Age: 43
End: 2018-10-08
Attending: OBSTETRICS & GYNECOLOGY
Payer: COMMERCIAL

## 2018-10-08 ENCOUNTER — TELEPHONE (OUTPATIENT)
Dept: OBSTETRICS AND GYNECOLOGY | Facility: CLINIC | Age: 43
End: 2018-10-08

## 2018-10-08 DIAGNOSIS — N91.2 AMENORRHEA: Primary | ICD-10-CM

## 2018-10-08 DIAGNOSIS — N91.2 AMENORRHEA: ICD-10-CM

## 2018-10-08 DIAGNOSIS — Z34.91 CURRENTLY PREGNANT IN FIRST TRIMESTER WITH UNKNOWN GESTATIONAL AGE: Primary | ICD-10-CM

## 2018-10-08 LAB — HCG INTACT+B SERPL-ACNC: NORMAL MIU/ML

## 2018-10-08 PROCEDURE — 84702 CHORIONIC GONADOTROPIN TEST: CPT

## 2018-10-08 PROCEDURE — 36415 COLL VENOUS BLD VENIPUNCTURE: CPT

## 2018-10-08 NOTE — TELEPHONE ENCOUNTER
Pt states she missed her pills and she had a positive home pregnancy test. She will go for HCG today. She doesn't remember her LMP

## 2018-10-08 NOTE — TELEPHONE ENCOUNTER
----- Message from Donny Lagunas sent at 10/8/2018  9:07 AM CDT -----  Contact: [phone]/self  Pt requesting to be seen today for birth control issues (pt did not care to elaborate)  Please call and advise

## 2018-10-10 ENCOUNTER — PROCEDURE VISIT (OUTPATIENT)
Dept: OBSTETRICS AND GYNECOLOGY | Facility: CLINIC | Age: 43
End: 2018-10-10
Payer: COMMERCIAL

## 2018-10-10 ENCOUNTER — TELEPHONE (OUTPATIENT)
Dept: OBSTETRICS AND GYNECOLOGY | Facility: CLINIC | Age: 43
End: 2018-10-10

## 2018-10-10 DIAGNOSIS — Z34.91 CURRENTLY PREGNANT IN FIRST TRIMESTER WITH UNKNOWN GESTATIONAL AGE: ICD-10-CM

## 2018-10-10 DIAGNOSIS — N91.2 AMENORRHEA: Primary | ICD-10-CM

## 2018-10-10 PROCEDURE — 76801 OB US < 14 WKS SINGLE FETUS: CPT | Mod: S$GLB,,, | Performed by: OBSTETRICS & GYNECOLOGY

## 2018-10-10 NOTE — TELEPHONE ENCOUNTER
----- Message from Estefania Burk sent at 10/10/2018  4:35 PM CDT -----  Contact: [phone]/self  Patient requesting to speak with you regarding scheduling a follow up appt.

## 2018-10-11 ENCOUNTER — PATIENT MESSAGE (OUTPATIENT)
Dept: OBSTETRICS AND GYNECOLOGY | Facility: CLINIC | Age: 43
End: 2018-10-11

## 2018-10-17 ENCOUNTER — LAB VISIT (OUTPATIENT)
Dept: LAB | Facility: HOSPITAL | Age: 43
End: 2018-10-17
Attending: OBSTETRICS & GYNECOLOGY
Payer: COMMERCIAL

## 2018-10-17 DIAGNOSIS — N91.2 AMENORRHEA: ICD-10-CM

## 2018-10-17 LAB — PROGEST SERPL-MCNC: 30.1 NG/ML

## 2018-10-17 PROCEDURE — 84144 ASSAY OF PROGESTERONE: CPT

## 2018-10-17 PROCEDURE — 36415 COLL VENOUS BLD VENIPUNCTURE: CPT

## 2018-10-22 ENCOUNTER — PATIENT MESSAGE (OUTPATIENT)
Dept: OBSTETRICS AND GYNECOLOGY | Facility: CLINIC | Age: 43
End: 2018-10-22

## 2018-10-22 ENCOUNTER — OFFICE VISIT (OUTPATIENT)
Dept: OBSTETRICS AND GYNECOLOGY | Facility: CLINIC | Age: 43
End: 2018-10-22
Payer: COMMERCIAL

## 2018-10-22 DIAGNOSIS — O20.0 THREATENED ABORTION: Primary | ICD-10-CM

## 2018-10-22 PROCEDURE — 99999 PR PBB SHADOW E&M-EST. PATIENT-LVL I: CPT | Mod: PBBFAC,,, | Performed by: OBSTETRICS & GYNECOLOGY

## 2018-10-22 PROCEDURE — 99213 OFFICE O/P EST LOW 20 MIN: CPT | Mod: S$GLB,,, | Performed by: OBSTETRICS & GYNECOLOGY

## 2018-10-22 NOTE — PROGRESS NOTES
43 y.o.   OB History      Para Term  AB Living    11 7 7   4 3    SAB TAB Ectopic Multiple Live Births    4       3        Comlaining of:   pt here for spotting, early pregnant, no pain  Worked las pm  Would be about 8 weeks by dating  Hx of THEO carroll:  GENERAL: No fever, chills, fatigability or weight loss.  SKIN: No rashes, itching or changes in color or texture of skin.  HEAD: No headaches or recent head trauma.  EYES: Visual acuity fine. No photophobia, ocular pain or diplopia.  EARS: Denies ear pain, discharge or vertigo.  NOSE: No loss of smell, no epistaxis or postnasal drip.  MOUTH & THROAT: No hoarseness or change in voice. No excessive gum bleeding.  NODES: Denies swollen glands.  CHEST: Denies GROVER, cyanosis, wheezing, cough and sputum production.  CARDIOVASCULAR: Denies chest pain, PND, orthopnea or reduced exercise tolerance.  ABDOMEN: Appetite fine. No weight loss. Denies diarrhea, abdominal pain, hematemesis or blood in stool.  URINARY: No flank pain, dysuria or hematuria.  PERIPHERAL VASCULAR: No claudication or cyanosis.  MUSCULOSKELETAL: No joint stiffness or swelling. Denies back pain.  NEUROLOGIC: No history of seizures, paralysis, alteration of gait or coordination      PE: There were no vitals taken for this visit.   No vs  abd soft  Bedside us done  'appears viable    A threatened ab  Plan, home, bedrest

## 2018-10-24 ENCOUNTER — PATIENT MESSAGE (OUTPATIENT)
Dept: OBSTETRICS AND GYNECOLOGY | Facility: CLINIC | Age: 43
End: 2018-10-24

## 2018-10-25 RX ORDER — ONDANSETRON HYDROCHLORIDE 8 MG/1
8 TABLET, FILM COATED ORAL EVERY 8 HOURS PRN
Qty: 20 TABLET | Refills: 1 | Status: SHIPPED | OUTPATIENT
Start: 2018-10-25 | End: 2019-01-10

## 2018-10-25 RX ORDER — ONDANSETRON 4 MG/1
4 TABLET, ORALLY DISINTEGRATING ORAL EVERY 6 HOURS PRN
Qty: 30 TABLET | Refills: 0 | Status: SHIPPED | OUTPATIENT
Start: 2018-10-25 | End: 2018-11-06

## 2018-11-06 ENCOUNTER — ROUTINE PRENATAL (OUTPATIENT)
Dept: OBSTETRICS AND GYNECOLOGY | Facility: CLINIC | Age: 43
End: 2018-11-06
Payer: COMMERCIAL

## 2018-11-06 VITALS
SYSTOLIC BLOOD PRESSURE: 102 MMHG | DIASTOLIC BLOOD PRESSURE: 67 MMHG | BODY MASS INDEX: 27.61 KG/M2 | WEIGHT: 176.25 LBS

## 2018-11-06 DIAGNOSIS — Z3A.10 10 WEEKS GESTATION OF PREGNANCY: Primary | ICD-10-CM

## 2018-11-06 PROCEDURE — 99999 PR PBB SHADOW E&M-EST. PATIENT-LVL II: CPT | Mod: PBBFAC,,, | Performed by: OBSTETRICS & GYNECOLOGY

## 2018-11-06 PROCEDURE — 0502F SUBSEQUENT PRENATAL CARE: CPT | Mod: S$GLB,,, | Performed by: OBSTETRICS & GYNECOLOGY

## 2018-11-08 LAB — Lab: NORMAL

## 2018-11-12 ENCOUNTER — TELEPHONE (OUTPATIENT)
Dept: OBSTETRICS AND GYNECOLOGY | Facility: CLINIC | Age: 43
End: 2018-11-12

## 2018-11-12 ENCOUNTER — PATIENT MESSAGE (OUTPATIENT)
Dept: OBSTETRICS AND GYNECOLOGY | Facility: CLINIC | Age: 43
End: 2018-11-12

## 2018-11-12 ENCOUNTER — ROUTINE PRENATAL (OUTPATIENT)
Dept: OBSTETRICS AND GYNECOLOGY | Facility: CLINIC | Age: 43
End: 2018-11-12
Payer: COMMERCIAL

## 2018-11-12 VITALS
DIASTOLIC BLOOD PRESSURE: 67 MMHG | SYSTOLIC BLOOD PRESSURE: 118 MMHG | WEIGHT: 178.44 LBS | BODY MASS INDEX: 27.95 KG/M2

## 2018-11-12 DIAGNOSIS — O20.0 THREATENED ABORTION: Primary | ICD-10-CM

## 2018-11-12 PROCEDURE — 99999 PR PBB SHADOW E&M-EST. PATIENT-LVL II: CPT | Mod: PBBFAC,,, | Performed by: OBSTETRICS & GYNECOLOGY

## 2018-11-12 PROCEDURE — 3008F BODY MASS INDEX DOCD: CPT | Mod: CPTII,S$GLB,, | Performed by: OBSTETRICS & GYNECOLOGY

## 2018-11-12 PROCEDURE — 99213 OFFICE O/P EST LOW 20 MIN: CPT | Mod: S$GLB,,, | Performed by: OBSTETRICS & GYNECOLOGY

## 2018-11-12 NOTE — PROGRESS NOTES
43 y.o.   OB History      Para Term  AB Living    8 3 3   4 3    SAB TAB Ectopic Multiple Live Births    4       3        Comlaining of:  Pt here for unscheduled visit for threatened miss  Called stating she bleed very heavy yesterday without cramps  No incident like activity , was at night, better this am  No pain, or passage of tissue  Pt brought in as emergency visit    ROS:  GENERAL: No fever, chills, fatigability or weight loss.  SKIN: No rashes, itching or changes in color or texture of skin.  HEAD: No headaches or recent head trauma.  EYES: Visual acuity fine. No photophobia, ocular pain or diplopia.  EARS: Denies ear pain, discharge or vertigo.  NOSE: No loss of smell, no epistaxis or postnasal drip.  MOUTH & THROAT: No hoarseness or change in voice. No excessive gum bleeding.  NODES: Denies swollen glands.  CHEST: Denies GROVER, cyanosis, wheezing, cough and sputum production.  CARDIOVASCULAR: Denies chest pain, PND, orthopnea or reduced exercise tolerance.  ABDOMEN: Appetite fine. No weight loss. Denies diarrhea, abdominal pain, hematemesis or blood in stool.  URINARY: No flank pain, dysuria or hematuria.  PERIPHERAL VASCULAR: No claudication or cyanosis.  MUSCULOSKELETAL: No joint stiffness or swelling. Denies back pain.  NEUROLOGIC: No history of seizures, paralysis, alteration of gait or coordination      PE: /67   Wt 81 kg (178 lb 7.4 oz)   LMP 2018   BMI 27.95 kg/m²    abd soft, nontender  cx closed, dark skant blood on glove  Bedside us done, good sac, fluid  Baby active, fhm good,     A threatened   Plan, rest, fluids, update judith

## 2018-11-15 ENCOUNTER — PATIENT MESSAGE (OUTPATIENT)
Dept: OBSTETRICS AND GYNECOLOGY | Facility: CLINIC | Age: 43
End: 2018-11-15

## 2018-12-10 ENCOUNTER — LAB VISIT (OUTPATIENT)
Dept: LAB | Facility: HOSPITAL | Age: 43
End: 2018-12-10
Attending: OBSTETRICS & GYNECOLOGY
Payer: COMMERCIAL

## 2018-12-10 ENCOUNTER — ROUTINE PRENATAL (OUTPATIENT)
Dept: OBSTETRICS AND GYNECOLOGY | Facility: CLINIC | Age: 43
End: 2018-12-10
Payer: COMMERCIAL

## 2018-12-10 VITALS — DIASTOLIC BLOOD PRESSURE: 78 MMHG | WEIGHT: 185.5 LBS | SYSTOLIC BLOOD PRESSURE: 122 MMHG | BODY MASS INDEX: 29.06 KG/M2

## 2018-12-10 DIAGNOSIS — Z3A.15 15 WEEKS GESTATION OF PREGNANCY: ICD-10-CM

## 2018-12-10 DIAGNOSIS — Z3A.15 15 WEEKS GESTATION OF PREGNANCY: Primary | ICD-10-CM

## 2018-12-10 LAB
ABO + RH BLD: NORMAL
BASOPHILS # BLD AUTO: 0.01 K/UL
BASOPHILS NFR BLD: 0.1 %
BLD GP AB SCN CELLS X3 SERPL QL: NORMAL
DIFFERENTIAL METHOD: ABNORMAL
EOSINOPHIL # BLD AUTO: 0.1 K/UL
EOSINOPHIL NFR BLD: 1.6 %
ERYTHROCYTE [DISTWIDTH] IN BLOOD BY AUTOMATED COUNT: 14 %
HCT VFR BLD AUTO: 34.9 %
HGB BLD-MCNC: 11.5 G/DL
LYMPHOCYTES # BLD AUTO: 1.7 K/UL
LYMPHOCYTES NFR BLD: 24.7 %
MCH RBC QN AUTO: 28.7 PG
MCHC RBC AUTO-ENTMCNC: 33 G/DL
MCV RBC AUTO: 87 FL
MONOCYTES # BLD AUTO: 0.6 K/UL
MONOCYTES NFR BLD: 8.9 %
NEUTROPHILS # BLD AUTO: 4.4 K/UL
NEUTROPHILS NFR BLD: 64.4 %
PLATELET # BLD AUTO: 220 K/UL
PMV BLD AUTO: 11.1 FL
RBC # BLD AUTO: 4.01 M/UL
WBC # BLD AUTO: 6.88 K/UL

## 2018-12-10 PROCEDURE — 99999 PR PBB SHADOW E&M-EST. PATIENT-LVL II: CPT | Mod: PBBFAC,,, | Performed by: OBSTETRICS & GYNECOLOGY

## 2018-12-10 PROCEDURE — 86703 HIV-1/HIV-2 1 RESULT ANTBDY: CPT

## 2018-12-10 PROCEDURE — 86850 RBC ANTIBODY SCREEN: CPT

## 2018-12-10 PROCEDURE — 85025 COMPLETE CBC W/AUTO DIFF WBC: CPT

## 2018-12-10 PROCEDURE — 86762 RUBELLA ANTIBODY: CPT

## 2018-12-10 PROCEDURE — 87340 HEPATITIS B SURFACE AG IA: CPT

## 2018-12-10 PROCEDURE — 36415 COLL VENOUS BLD VENIPUNCTURE: CPT

## 2018-12-10 PROCEDURE — 0502F SUBSEQUENT PRENATAL CARE: CPT | Mod: S$GLB,,, | Performed by: OBSTETRICS & GYNECOLOGY

## 2018-12-10 PROCEDURE — 86592 SYPHILIS TEST NON-TREP QUAL: CPT

## 2018-12-10 NOTE — PROGRESS NOTES
fht good, fh 15  Bedside us, grossly normal , good fluid, active  Plan, labs, set up us with mfm  For ama

## 2018-12-11 ENCOUNTER — PATIENT MESSAGE (OUTPATIENT)
Dept: OBSTETRICS AND GYNECOLOGY | Facility: CLINIC | Age: 43
End: 2018-12-11

## 2018-12-11 ENCOUNTER — TELEPHONE (OUTPATIENT)
Dept: OBSTETRICS AND GYNECOLOGY | Facility: CLINIC | Age: 43
End: 2018-12-11

## 2018-12-11 LAB
HBV SURFACE AG SERPL QL IA: NEGATIVE
HIV 1+2 AB+HIV1 P24 AG SERPL QL IA: NEGATIVE
RPR SER QL: NORMAL
RUBV IGG SER-ACNC: 20.2 IU/ML
RUBV IGG SER-IMP: REACTIVE

## 2019-01-10 ENCOUNTER — PROCEDURE VISIT (OUTPATIENT)
Dept: MATERNAL FETAL MEDICINE | Facility: HOSPITAL | Age: 44
End: 2019-01-10
Payer: COMMERCIAL

## 2019-01-10 VITALS — SYSTOLIC BLOOD PRESSURE: 130 MMHG | DIASTOLIC BLOOD PRESSURE: 80 MMHG | WEIGHT: 193 LBS | BODY MASS INDEX: 30.23 KG/M2

## 2019-01-10 DIAGNOSIS — O34.219 PREGNANCY WITH HISTORY OF CESAREAN SECTION, ANTEPARTUM: ICD-10-CM

## 2019-01-10 DIAGNOSIS — O09.522 ELDERLY MULTIGRAVIDA IN SECOND TRIMESTER: Primary | ICD-10-CM

## 2019-01-10 DIAGNOSIS — O28.3 ECHOGENIC INTRACARDIAC FOCUS OF FETUS ON PRENATAL ULTRASOUND: ICD-10-CM

## 2019-01-10 DIAGNOSIS — Z36.3 ANTENATAL SCREENING FOR MALFORMATION USING ULTRASONICS: ICD-10-CM

## 2019-01-10 DIAGNOSIS — O26.20 RECURRENT PREGNANCY LOSS WITH CURRENT PREGNANCY: ICD-10-CM

## 2019-01-10 PROCEDURE — 99213 OFFICE O/P EST LOW 20 MIN: CPT | Mod: 25,,, | Performed by: OBSTETRICS & GYNECOLOGY

## 2019-01-10 PROCEDURE — 76811 OB US DETAILED SNGL FETUS: CPT

## 2019-01-10 PROCEDURE — 76811 OB US DETAILED SNGL FETUS: CPT | Mod: 26,,, | Performed by: OBSTETRICS & GYNECOLOGY

## 2019-01-10 PROCEDURE — 76811 PR US, OB FETAL EVAL & EXAM, TRANSABDOM,FIRST GESTATION: ICD-10-PCS | Mod: 26,,, | Performed by: OBSTETRICS & GYNECOLOGY

## 2019-01-10 PROCEDURE — 99213 PR OFFICE/OUTPT VISIT, EST, LEVL III, 20-29 MIN: ICD-10-PCS | Mod: 25,,, | Performed by: OBSTETRICS & GYNECOLOGY

## 2019-01-14 ENCOUNTER — TELEPHONE (OUTPATIENT)
Dept: OBSTETRICS AND GYNECOLOGY | Facility: CLINIC | Age: 44
End: 2019-01-14

## 2019-01-14 NOTE — TELEPHONE ENCOUNTER
----- Message from Shekhar Steward sent at 1/14/2019  1:12 PM CST -----  Contact: [phone]  Patient called in returning your call. Please call.

## 2019-01-14 NOTE — TELEPHONE ENCOUNTER
----- Message from Estefania Burk sent at 1/14/2019 12:37 PM CST -----  Contact: [phone] self  Patient is returning your call. Please advise.

## 2019-01-16 ENCOUNTER — PATIENT MESSAGE (OUTPATIENT)
Dept: OBSTETRICS AND GYNECOLOGY | Facility: CLINIC | Age: 44
End: 2019-01-16

## 2019-01-31 ENCOUNTER — ROUTINE PRENATAL (OUTPATIENT)
Dept: OBSTETRICS AND GYNECOLOGY | Facility: CLINIC | Age: 44
End: 2019-01-31
Payer: COMMERCIAL

## 2019-01-31 VITALS
SYSTOLIC BLOOD PRESSURE: 112 MMHG | DIASTOLIC BLOOD PRESSURE: 68 MMHG | BODY MASS INDEX: 31.18 KG/M2 | WEIGHT: 199.06 LBS

## 2019-01-31 DIAGNOSIS — Z3A.22 22 WEEKS GESTATION OF PREGNANCY: Primary | ICD-10-CM

## 2019-01-31 PROCEDURE — 99999 PR PBB SHADOW E&M-EST. PATIENT-LVL II: CPT | Mod: PBBFAC,,, | Performed by: OBSTETRICS & GYNECOLOGY

## 2019-01-31 PROCEDURE — 99999 PR PBB SHADOW E&M-EST. PATIENT-LVL II: ICD-10-PCS | Mod: PBBFAC,,, | Performed by: OBSTETRICS & GYNECOLOGY

## 2019-01-31 PROCEDURE — 0502F PR SUBSEQUENT PRENATAL CARE: ICD-10-PCS | Mod: S$GLB,,, | Performed by: OBSTETRICS & GYNECOLOGY

## 2019-01-31 PROCEDURE — 0502F SUBSEQUENT PRENATAL CARE: CPT | Mod: S$GLB,,, | Performed by: OBSTETRICS & GYNECOLOGY

## 2019-01-31 RX ORDER — ASPIRIN 81 MG/1
81 TABLET ORAL DAILY
COMMUNITY
End: 2019-08-08 | Stop reason: ALTCHOICE

## 2019-02-11 ENCOUNTER — PROCEDURE VISIT (OUTPATIENT)
Dept: MATERNAL FETAL MEDICINE | Facility: HOSPITAL | Age: 44
End: 2019-02-11
Attending: OBSTETRICS & GYNECOLOGY
Payer: COMMERCIAL

## 2019-02-11 VITALS — WEIGHT: 195 LBS | SYSTOLIC BLOOD PRESSURE: 118 MMHG | BODY MASS INDEX: 30.54 KG/M2 | DIASTOLIC BLOOD PRESSURE: 70 MMHG

## 2019-02-11 DIAGNOSIS — O09.522 ELDERLY MULTIGRAVIDA IN SECOND TRIMESTER: ICD-10-CM

## 2019-02-11 PROCEDURE — 76816 PR  US,PREGNANT UTERUS,F/U,TRANSABD APP: ICD-10-PCS | Mod: 26,,, | Performed by: PEDIATRICS

## 2019-02-11 PROCEDURE — 76816 OB US FOLLOW-UP PER FETUS: CPT | Mod: 26,,, | Performed by: PEDIATRICS

## 2019-02-11 PROCEDURE — 99499 UNLISTED E&M SERVICE: CPT | Mod: ,,, | Performed by: PEDIATRICS

## 2019-02-11 PROCEDURE — 99499 NO LOS: ICD-10-PCS | Mod: ,,, | Performed by: PEDIATRICS

## 2019-02-11 PROCEDURE — 76816 OB US FOLLOW-UP PER FETUS: CPT

## 2019-02-11 NOTE — PROGRESS NOTES
Indication  ========    Evaluation of fetal growth.    History  ======    General History  Other: AMA: cell free DNA screen negative  recurrent pregnancy loss  C/S x 3  Previous Outcomes  Preg. no. 1  Outcome: Live YOB: 2004  Gest. age 39 w + 5 d  Gender: male  Details: C/S FTP: 7lb 1 oz  Preg. no. 2  Outcome: Live YOB: 2008  Gest. age 39 w + 0 d  Gender: female  Details: 10 lbs; C/S  Preg. no. 3  Outcome: Live YOB: 2011  Gest. age 39 w + 0 d  Gender: male  Details: 8 lbs; C/S  Preg. no. 4  Outcome: Spontaneous miscarriage  Gest. age 8 w + 0 d  Details: D& C  Preg. no. 5  Outcome: Spontaneous miscarriage  Gest. age 7 w + 0 d  Preg. no. 6  Outcome: Spontaneous miscarriage  Gest. age 7 w + 0 d  Details: Blighted Ovum  Preg. no. 7  Outcome: Spontaneous miscarriage  Gest. age 7 w + 0 d  Details: D& C   8  Para 3  [facility] children born living (T) 3  [facility] children born (T) 3  Abortions (A) 4  [facility] living children (L) 3  Miscarriages 4  Risk Factors  History risk factors: AMA    Pregnancy History  ==============    Maternal Lab Tests  Test: Cell Free DNA Testing  Result: Materni T21: Negative  Wants to know gender: yes    Maternal Assessment  =================    BP syst 118 mmHg  BP diast 70 mmHg    Method  ======    2D Color Doppler, , Zuleika iU22, Transabdominal ultrasound examination.    Pregnancy  =========    [facility] pregnancy. Number of fetuses: 1.    Dating  ======    Cycle: regular cycle  Ultrasound examination on: 2019  GA by U/S based upon: AC, BPD, Femur, HC  GA by U/S 25 w + 5 d  GRICEL by U/S: 2019  Assigned: Dating performed on 10/10/2018, based on ultrasound ([facility])  Assigned GA 24 w + 2 d  Assigned GRICEL: 2019    General Evaluation  ==============    Cardiac activity: present.  bpm.  Fetal movements: visualized.  Presentation: cephalic.  Placenta:  Placental site: left lateral.  Umbilical cord: Cord vessels: 3 vessel cord.  Amniotic  fluid: Amount of AF: normal amount. MVP 7.0 cm.    Fetal Biometry  ============    Fetal Biometry  BPD 63.6 mm 25w 5d Hadlock  .5 mm 25w 4d Hadlock  .3 mm 25w 5d Hadlock  Femur 47.7 mm 26w 0d Hadlock   g 73% Robin  Calculated by: Hadlock (BPD-HC-AC-FL)  EFW (lb) 1 lb  EFW (oz) 14 oz  HC / AC 1.11  FL / BPD 0.75  FL / AC 0.22  MVP 7.0 cm   bpm    Fetal Anatomy  ============    Cranium: normal  Posterior fossa: normal  Neck: normal  4-chamber view: 4-chamber normal, septum normal, multiple echogenic foci  SVC: normal  IVC: normal  3-vessel view: normal  3-vessel-trachea view: normal  Stomach: normal  Kidneys: normal  Bladder: normal  Cervical spine: normal  Thoracic spine: normal  Lumbar spine: normal  Sacral spine: normal  Gender: male  Wants to know gender: yes  Other: A full anatomy survey previously performed.          Impression  =========    The fetal anatomic survey was completed today, and no fetal structural abnormalities were noted.    Interval fetal growth has been normal, and the AFV is normal.              Recommendation  ==============    1. Growth at 32 weeks or as clinically indicated.  2. APT weekly at 32 weeks.  3. Other studies as clinically indicated.    Thank you again for allowing us to participate in the care of your patients. If you have any questions concerning today's consultation, feel free  to contact me or one of my partners. We can be reached at [phone] during normal business hours. If you have a question after normal  business hours, please contact Labor and Delivery at [phone].

## 2019-03-06 ENCOUNTER — OFFICE VISIT (OUTPATIENT)
Dept: INTERNAL MEDICINE | Facility: CLINIC | Age: 44
End: 2019-03-06
Payer: COMMERCIAL

## 2019-03-06 VITALS
BODY MASS INDEX: 30.76 KG/M2 | HEIGHT: 67 IN | WEIGHT: 196 LBS | SYSTOLIC BLOOD PRESSURE: 100 MMHG | HEART RATE: 93 BPM | DIASTOLIC BLOOD PRESSURE: 70 MMHG | TEMPERATURE: 99 F

## 2019-03-06 DIAGNOSIS — Z3A.20 PREGNANCY WITH 20 COMPLETED WEEKS GESTATION: ICD-10-CM

## 2019-03-06 DIAGNOSIS — J02.9 SORE THROAT: ICD-10-CM

## 2019-03-06 DIAGNOSIS — J01.40 ACUTE NON-RECURRENT PANSINUSITIS: Primary | ICD-10-CM

## 2019-03-06 LAB — DEPRECATED S PYO AG THROAT QL EIA: NEGATIVE

## 2019-03-06 PROCEDURE — 87880 STREP A ASSAY W/OPTIC: CPT | Mod: PO

## 2019-03-06 PROCEDURE — 3008F PR BODY MASS INDEX (BMI) DOCUMENTED: ICD-10-PCS | Mod: CPTII,S$GLB,, | Performed by: INTERNAL MEDICINE

## 2019-03-06 PROCEDURE — 3008F BODY MASS INDEX DOCD: CPT | Mod: CPTII,S$GLB,, | Performed by: INTERNAL MEDICINE

## 2019-03-06 PROCEDURE — 99213 OFFICE O/P EST LOW 20 MIN: CPT | Mod: S$GLB,,, | Performed by: INTERNAL MEDICINE

## 2019-03-06 PROCEDURE — 99213 PR OFFICE/OUTPT VISIT, EST, LEVL III, 20-29 MIN: ICD-10-PCS | Mod: S$GLB,,, | Performed by: INTERNAL MEDICINE

## 2019-03-06 PROCEDURE — 99999 PR PBB SHADOW E&M-EST. PATIENT-LVL III: CPT | Mod: PBBFAC,,, | Performed by: INTERNAL MEDICINE

## 2019-03-06 PROCEDURE — 87081 CULTURE SCREEN ONLY: CPT

## 2019-03-06 PROCEDURE — 99999 PR PBB SHADOW E&M-EST. PATIENT-LVL III: ICD-10-PCS | Mod: PBBFAC,,, | Performed by: INTERNAL MEDICINE

## 2019-03-06 RX ORDER — AMOXICILLIN 500 MG/1
500 TABLET, FILM COATED ORAL EVERY 12 HOURS
Qty: 20 TABLET | Refills: 0 | Status: SHIPPED | OUTPATIENT
Start: 2019-03-06 | End: 2019-03-16

## 2019-03-06 NOTE — PATIENT INSTRUCTIONS
Saline nasal spray as needed    Tylenol 650mg three times daily as needed    Chloraseptic spray as needed for sore throat

## 2019-03-06 NOTE — PROGRESS NOTES
Subjective:       Patient ID: Gladis Sesay is a 43 y.o. female who presents for Sore Throat      Sore Throat    This is a new problem. The current episode started in the past 7 days (started 4-5 days ago). The problem has been gradually worsening. Neither side of throat is experiencing more pain than the other. There has been no fever. The pain is at a severity of 10/10. The pain is severe. Associated symptoms include congestion, coughing (spits up yellow mucus regularly), headaches (frontal sinusitis), swollen glands and trouble swallowing. Pertinent negatives include no abdominal pain, diarrhea, ear discharge, ear pain, hoarse voice, plugged ear sensation, shortness of breath or vomiting. She has had no exposure to strep. She has tried gargles and acetaminophen for the symptoms. The treatment provided mild relief.      One of children was sick a few weeks ago. Denies any contact with other sick people. No recent travel.       Review of Systems   Constitutional: Positive for diaphoresis. Negative for chills and fever.   HENT: Positive for congestion, postnasal drip, sore throat and trouble swallowing. Negative for ear discharge, ear pain, hoarse voice, sinus pressure and sinus pain.    Eyes: Negative for discharge, redness and itching.   Respiratory: Positive for cough (spits up yellow mucus regularly). Negative for chest tightness, shortness of breath and wheezing.    Cardiovascular: Negative for chest pain and palpitations.   Gastrointestinal: Negative for abdominal pain, constipation, diarrhea, nausea and vomiting.   Musculoskeletal: Negative for arthralgias and myalgias.   Skin: Negative for rash.   Neurological: Positive for headaches (frontal sinusitis). Negative for dizziness and weakness.   Hematological: Positive for adenopathy.       Objective:      Physical Exam   Constitutional: She is oriented to person, place, and time. Vital signs are normal. She appears well-developed and well-nourished. No  distress.   HENT:   Head: Normocephalic and atraumatic.   Right Ear: Hearing, tympanic membrane, external ear and ear canal normal. Tympanic membrane is not erythematous and not bulging.   Left Ear: Hearing, tympanic membrane, external ear and ear canal normal. Tympanic membrane is not erythematous and not bulging.   Nose: Rhinorrhea present. Right sinus exhibits frontal sinus tenderness. Right sinus exhibits no maxillary sinus tenderness. Left sinus exhibits frontal sinus tenderness. Left sinus exhibits no maxillary sinus tenderness.   Mouth/Throat: Uvula is midline. Posterior oropharyngeal erythema present. No oropharyngeal exudate.   Eyes: Lids are normal.   Neck: Normal range of motion. Neck supple.   Cardiovascular: Normal rate, regular rhythm, normal heart sounds and intact distal pulses.   No murmur heard.  Pulmonary/Chest: Effort normal and breath sounds normal. She has no wheezes.   Abdominal: Soft. Bowel sounds are normal.   Gravid uterus   Musculoskeletal: Normal range of motion. She exhibits no edema.   Lymphadenopathy:        Head (right side): No submandibular, no preauricular and no posterior auricular adenopathy present.        Head (left side): No submandibular, no preauricular and no posterior auricular adenopathy present.     She has cervical adenopathy (scattered small palpable LN bilaterally).        Right cervical: Superficial cervical adenopathy present.        Left cervical: Superficial cervical adenopathy present.   Neurological: She is alert and oriented to person, place, and time.   Skin: Skin is warm, dry and intact. No rash noted. She is not diaphoretic.   Psychiatric: She has a normal mood and affect.   Vitals reviewed.      Assessment/Plan:         1. Acute non-recurrent pansinusitis  - amoxicillin (AMOXIL) 500 MG Tab; Take 1 tablet (500 mg total) by mouth every 12 (twelve) hours. for 10 days  Dispense: 20 tablet; Refill: 0    2. Sore throat  - Throat Screen, Rapid  - amoxicillin  (AMOXIL) 500 MG Tab; Take 1 tablet (500 mg total) by mouth every 12 (twelve) hours. for 10 days  Dispense: 20 tablet; Refill: 0  - chloraseptic spray as needed for sore throat    3. Pregnancy with 20 completed weeks gestation    Jihan Gilmore MD

## 2019-03-08 LAB — BACTERIA THROAT CULT: NORMAL

## 2019-03-12 ENCOUNTER — LAB VISIT (OUTPATIENT)
Dept: LAB | Facility: HOSPITAL | Age: 44
End: 2019-03-12
Attending: OBSTETRICS & GYNECOLOGY
Payer: COMMERCIAL

## 2019-03-12 ENCOUNTER — ROUTINE PRENATAL (OUTPATIENT)
Dept: OBSTETRICS AND GYNECOLOGY | Facility: CLINIC | Age: 44
End: 2019-03-12
Payer: COMMERCIAL

## 2019-03-12 VITALS
BODY MASS INDEX: 31.13 KG/M2 | DIASTOLIC BLOOD PRESSURE: 60 MMHG | SYSTOLIC BLOOD PRESSURE: 122 MMHG | WEIGHT: 198.75 LBS

## 2019-03-12 DIAGNOSIS — Z3A.22 22 WEEKS GESTATION OF PREGNANCY: ICD-10-CM

## 2019-03-12 DIAGNOSIS — Z3A.28 28 WEEKS GESTATION OF PREGNANCY: Primary | ICD-10-CM

## 2019-03-12 LAB — GLUCOSE SERPL-MCNC: 179 MG/DL

## 2019-03-12 PROCEDURE — 36415 COLL VENOUS BLD VENIPUNCTURE: CPT

## 2019-03-12 PROCEDURE — 99999 PR PBB SHADOW E&M-EST. PATIENT-LVL II: CPT | Mod: PBBFAC,,, | Performed by: OBSTETRICS & GYNECOLOGY

## 2019-03-12 PROCEDURE — 99999 PR PBB SHADOW E&M-EST. PATIENT-LVL II: ICD-10-PCS | Mod: PBBFAC,,, | Performed by: OBSTETRICS & GYNECOLOGY

## 2019-03-12 PROCEDURE — 0502F SUBSEQUENT PRENATAL CARE: CPT | Mod: CPTII,S$GLB,, | Performed by: OBSTETRICS & GYNECOLOGY

## 2019-03-12 PROCEDURE — 82950 GLUCOSE TEST: CPT

## 2019-03-12 PROCEDURE — 0502F PR SUBSEQUENT PRENATAL CARE: ICD-10-PCS | Mod: CPTII,S$GLB,, | Performed by: OBSTETRICS & GYNECOLOGY

## 2019-03-13 ENCOUNTER — TELEPHONE (OUTPATIENT)
Dept: OBSTETRICS AND GYNECOLOGY | Facility: CLINIC | Age: 44
End: 2019-03-13

## 2019-03-13 ENCOUNTER — PATIENT MESSAGE (OUTPATIENT)
Dept: OBSTETRICS AND GYNECOLOGY | Facility: CLINIC | Age: 44
End: 2019-03-13

## 2019-03-13 DIAGNOSIS — N91.2 AMENORRHEA: Primary | ICD-10-CM

## 2019-03-13 DIAGNOSIS — O24.419 GESTATIONAL DIABETES MELLITUS (GDM) IN THIRD TRIMESTER, GESTATIONAL DIABETES METHOD OF CONTROL UNSPECIFIED: Primary | ICD-10-CM

## 2019-03-15 ENCOUNTER — LAB VISIT (OUTPATIENT)
Dept: LAB | Facility: HOSPITAL | Age: 44
End: 2019-03-15
Attending: OBSTETRICS & GYNECOLOGY
Payer: COMMERCIAL

## 2019-03-15 DIAGNOSIS — N91.2 AMENORRHEA: ICD-10-CM

## 2019-03-15 LAB
GLUCOSE SERPL-MCNC: 110 MG/DL
GLUCOSE SERPL-MCNC: 126 MG/DL
GLUCOSE SERPL-MCNC: 181 MG/DL
GLUCOSE SERPL-MCNC: 211 MG/DL

## 2019-03-15 PROCEDURE — 82952 GTT-ADDED SAMPLES: CPT

## 2019-03-15 PROCEDURE — 82951 GLUCOSE TOLERANCE TEST (GTT): CPT

## 2019-03-15 PROCEDURE — 36415 COLL VENOUS BLD VENIPUNCTURE: CPT

## 2019-03-18 ENCOUNTER — TELEPHONE (OUTPATIENT)
Dept: OBSTETRICS AND GYNECOLOGY | Facility: CLINIC | Age: 44
End: 2019-03-18

## 2019-03-21 ENCOUNTER — PROCEDURE VISIT (OUTPATIENT)
Dept: MATERNAL FETAL MEDICINE | Facility: HOSPITAL | Age: 44
End: 2019-03-21
Payer: COMMERCIAL

## 2019-03-21 VITALS — WEIGHT: 199 LBS | DIASTOLIC BLOOD PRESSURE: 80 MMHG | BODY MASS INDEX: 31.17 KG/M2 | SYSTOLIC BLOOD PRESSURE: 130 MMHG

## 2019-03-21 DIAGNOSIS — Z36.4 ANTENATAL SCREENING FOR FETAL GROWTH RETARDATION USING ULTRASONICS: ICD-10-CM

## 2019-03-21 DIAGNOSIS — O24.419 GESTATIONAL DIABETES MELLITUS (GDM) IN THIRD TRIMESTER, GESTATIONAL DIABETES METHOD OF CONTROL UNSPECIFIED: ICD-10-CM

## 2019-03-21 DIAGNOSIS — O34.219 PREGNANCY WITH HISTORY OF CESAREAN SECTION, ANTEPARTUM: ICD-10-CM

## 2019-03-21 DIAGNOSIS — O09.523 ELDERLY MULTIGRAVIDA IN THIRD TRIMESTER: Primary | ICD-10-CM

## 2019-03-21 DIAGNOSIS — O09.522 ELDERLY MULTIGRAVIDA IN SECOND TRIMESTER: ICD-10-CM

## 2019-03-21 PROCEDURE — 99212 OFFICE O/P EST SF 10 MIN: CPT | Mod: 25,,, | Performed by: OBSTETRICS & GYNECOLOGY

## 2019-03-21 PROCEDURE — 76816 PR  US,PREGNANT UTERUS,F/U,TRANSABD APP: ICD-10-PCS | Mod: 26,,, | Performed by: OBSTETRICS & GYNECOLOGY

## 2019-03-21 PROCEDURE — 76816 OB US FOLLOW-UP PER FETUS: CPT | Mod: 26,,, | Performed by: OBSTETRICS & GYNECOLOGY

## 2019-03-21 PROCEDURE — 76816 OB US FOLLOW-UP PER FETUS: CPT

## 2019-03-21 PROCEDURE — 99212 PR OFFICE/OUTPT VISIT, EST, LEVL II, 10-19 MIN: ICD-10-PCS | Mod: 25,,, | Performed by: OBSTETRICS & GYNECOLOGY

## 2019-03-25 ENCOUNTER — CLINICAL SUPPORT (OUTPATIENT)
Dept: DIABETES | Facility: CLINIC | Age: 44
End: 2019-03-25
Payer: COMMERCIAL

## 2019-03-25 ENCOUNTER — TELEPHONE (OUTPATIENT)
Dept: OBSTETRICS AND GYNECOLOGY | Facility: CLINIC | Age: 44
End: 2019-03-25

## 2019-03-25 VITALS — BODY MASS INDEX: 31.17 KG/M2 | WEIGHT: 199 LBS

## 2019-03-25 DIAGNOSIS — O24.419 GESTATIONAL DIABETES MELLITUS (GDM) IN THIRD TRIMESTER, GESTATIONAL DIABETES METHOD OF CONTROL UNSPECIFIED: ICD-10-CM

## 2019-03-25 PROCEDURE — G0108 DIAB MANAGE TRN  PER INDIV: HCPCS | Mod: S$GLB,,, | Performed by: INTERNAL MEDICINE

## 2019-03-25 PROCEDURE — 99999 PR PBB SHADOW E&M-EST. PATIENT-LVL I: CPT | Mod: PBBFAC,,,

## 2019-03-25 PROCEDURE — G0108 PR DIAB MANAGE TRN  PER INDIV: ICD-10-PCS | Mod: S$GLB,,, | Performed by: INTERNAL MEDICINE

## 2019-03-25 PROCEDURE — 99999 PR PBB SHADOW E&M-EST. PATIENT-LVL I: ICD-10-PCS | Mod: PBBFAC,,,

## 2019-03-25 NOTE — PROGRESS NOTES
Diabetes Education  Author: Viv Gomez RN  Date: 3/25/2019  Diabetes Care Management Summary  Diabetes Education Record Assessment/Progress: Initial  Current Diabetes Risk Level: Moderate   Last A1c: No results found for: HGBA1C  Last visit with Diabetes Educator: : 03/25/2019  Diabetes Type  Diabetes Type : Gestational  Diabetes History  Diabetes Diagnosis: 0-1 year  Current Treatment: Diet, Exercise  Health Maintenance was reviewed today with patient. Discussed with patient importance of routine eye exams, foot exams/foot care, blood work (i.e.: A1c, microalbumin, and lipid), dental visits, yearly flu vaccine, and pneumonia vaccine as indicated by PCP. Patient verbalized understanding.     Health Maintenance Topics with due status: Not Due       Topic Last Completion Date    Pap Smear with HPV Cotest 07/31/2018    Mammogram 08/09/2018     Health Maintenance Due   Topic Date Due    Lipid Panel  1975    TETANUS VACCINE  04/18/1993    Influenza Vaccine  08/01/2018   Nutrition  Meal Planning: skipping meals, water  What type of sweetener do you use?: sugar  What type of beverages do you drink?: regular soda/tea, juice, water  Meal Plan 24 Hour Recall - Breakfast: 1 egg, chicken  Meal Plan 24 Hour Recall - Lunch: 1/2 burger  Meal Plan 24 Hour Recall - Dinner: nothing  Meal Plan 24 Hour Recall - Snack: fruit  Monitoring   Self Monitoring : pt was given a true metrix air meter and was instructed in its use. Pt was instructed to test 4 times a day- fasting in am and 2 hours after breakfst, lunch and dinner. instructed pt on the normal bs ranges. instructed pt to keep a record of her bs's and to bring the record to her md visits. Pt will fu with md to get testing supplies ordered  Blood Glucose Logs: No  Do you use a personal continuous glucose monitor?: No  In the last month, how often have you had a low blood sugar reaction?: never  Can you tell when your blood sugar is too high?: no  Exercise   Exercise  Type: none  Current Diabetes Treatment   Current Treatment: Diet, Exercise  Social History  Preferred Learning Method: Face to Face, Demonstration, Reading Materials  Primary Support: Spouse  Educational Level: College Graduate  Occupation: nurse  Smoking Status: Never a Smoker  Alcohol Use: Never    DDS-2 Score  ( > 3 = SIGNIFICANT DISTRESS): 2     Barriers to Change  Barriers to Change: None  Learning Challenges : None  Readiness to Learn   Readiness to Learn : Acceptance  Cultural Influences  Cultural Influences: None  Diabetes Education Assessment/Progress  Diabetes Disease Process (diabetes disease process and treatment options): Discussion, Instructed, Demonstrates Understanding/Competency(verbalizes/demonstrates), Individual Session, Written Materials Provided  Nutrition (Incorporating nutritional management into one's lifestyle): Discussion, Demonstration, Instructed, Demonstrates Understanding/Competency (verbalizes/demonstrates), Written Materials Provided  Physical Activity (incorporating physical activity into one's lifestyle): Discussion, Instructed, Demonstrates Understanding/Competency (verbalizes/demonstrates), Individual Session, Written Materials Provided  Medications (states correct name, dose, onset, peak, duration, side effects & timing of meds): Not Covered/Deferred  Monitoring (monitoring blood glucose/other parameters & using results): Demonstration, Discussion, Instructed, Demonstrates Understanding/Competency (verbalizes/demonstrates), Written Materials Provided, Individual Session  Acute Complications (preventing, detecting, and treating acute complications): Not Covered/Deferred  Chronic Complications (preventing, detecting, and treating chronic complications): Not Covered/Deferred  Clinical (diabetes, other pertinent medical history, and relevant comorbidities reviewed during visit): Discussion, Instructed, Demonstrates Understanding/Competency (verbalizes/demonstrates), Individual  Session  Cognitive (knowledge of self-management skills, functional health literacy): Discussion, Instructed, Demonstrates Understanding/Competency (verbalizes/demonstrates), Individual Session  Psychosocial (emotional response to diabetes): Discussion, Instructed, Individual Session  Diabetes Distress and Support Systems: Discussion, Instructed, Demonstrates Understanding/Competency (verbalizes/demonstrates), Individual Session  Behavioral (readiness for change, lifestyle practices, self-care behaviors): Discussion, Instructed, Individual Session  Goals  Patient has selected/evaluated goals during today's session: Yes, selected  Healthy Eating: Set  Start Date: 03/25/19  Target Date: 06/25/19  Start Date: 03/25/19  Target Date: 06/25/19  Diabetes Meal Plan  Restrictions: Restricted Carbohydrate  Calories: 2000  Carbohydrate Per Meal: 45-60g  Carbohydrate Per Snack : 15-30g  Pt came to clinic for gdm education. Pt is unhappy about having to check her bs's 4 times a day. Instructed pt on meal plan with 3 meals and 3 snacks per day. Instructed pt on the food groups, how to read labels and count carbs. Pt was given sample menus and meal plans as examples. Pt states that she is nauseated and has reflux and usually skips dinner. Pt states that she is not going to be able to eat what is required for dinner. Pt said she would discuss this with her md. Discussed with pt food alternatives that have carbs that might not be as filling. Discussed with pt foods that she likes that she could include in her meal plan. Pt had good understanding of meal plan and compliance is hoped for. Pt was advised to call for any problems or questions.   Today's Self-Management Care Plan was developed with the patient's input and is based on barriers identified during today's assessment.    The long and short-term goals in the care plan were written with the patient/caregiver's input. The patient has agreed to work toward these goals to improve  her overall diabetes control.      The patient received a copy of today's self-management plan and verbalized understanding of the care plan, goals, and all of today's instructions.      The patient was encouraged to communicate with her physician and care team regarding her condition(s) and treatment.  I provided the patient with my contact information today and encouraged her to contact me via phone or patient portal as needed.     Education Units of Time   Time Spent: 30 min

## 2019-03-29 ENCOUNTER — TELEPHONE (OUTPATIENT)
Dept: OBSTETRICS AND GYNECOLOGY | Facility: CLINIC | Age: 44
End: 2019-03-29

## 2019-03-29 NOTE — TELEPHONE ENCOUNTER
----- Message from Zahra Morales sent at 3/29/2019 12:56 PM CDT -----  Contact: Self [phone]  Patient would like to speak with you about her medication not being covered. Please advise

## 2019-03-29 NOTE — TELEPHONE ENCOUNTER
Spoke to pt who said that the stips for the true metrix are not covered by insurance but the freestyle luis fernando is. Pt notified that there is a sensor that she would have to leave in for 14 days. Pt would like to try it. Rx sent to [facility]

## 2019-03-29 NOTE — TELEPHONE ENCOUNTER
----- Message from Nyasia Jody sent at 3/29/2019  2:00 PM CDT -----  Contact: self, [phone] (M)  Patient called in returning your call. Please advise.

## 2019-04-01 ENCOUNTER — PATIENT MESSAGE (OUTPATIENT)
Dept: OBSTETRICS AND GYNECOLOGY | Facility: CLINIC | Age: 44
End: 2019-04-01

## 2019-04-12 ENCOUNTER — ROUTINE PRENATAL (OUTPATIENT)
Dept: OBSTETRICS AND GYNECOLOGY | Facility: CLINIC | Age: 44
End: 2019-04-12
Payer: COMMERCIAL

## 2019-04-12 VITALS
DIASTOLIC BLOOD PRESSURE: 66 MMHG | WEIGHT: 204.06 LBS | SYSTOLIC BLOOD PRESSURE: 112 MMHG | BODY MASS INDEX: 31.96 KG/M2

## 2019-04-12 DIAGNOSIS — Z3A.32 32 WEEKS GESTATION OF PREGNANCY: Primary | ICD-10-CM

## 2019-04-12 PROCEDURE — 0502F SUBSEQUENT PRENATAL CARE: CPT | Mod: CPTII,S$GLB,, | Performed by: OBSTETRICS & GYNECOLOGY

## 2019-04-12 PROCEDURE — 99999 PR PBB SHADOW E&M-EST. PATIENT-LVL II: CPT | Mod: PBBFAC,,, | Performed by: OBSTETRICS & GYNECOLOGY

## 2019-04-12 PROCEDURE — 0502F PR SUBSEQUENT PRENATAL CARE: ICD-10-PCS | Mod: CPTII,S$GLB,, | Performed by: OBSTETRICS & GYNECOLOGY

## 2019-04-12 PROCEDURE — 99999 PR PBB SHADOW E&M-EST. PATIENT-LVL II: ICD-10-PCS | Mod: PBBFAC,,, | Performed by: OBSTETRICS & GYNECOLOGY

## 2019-04-12 NOTE — PROGRESS NOTES
fht good, uterus soft, fh 32, baby very actvie  Watching sugars, having insurance problems getting monitoring equip  State all fastings are great  Will start nst's   Has appt with mfm next week, bring sugars!

## 2019-04-16 ENCOUNTER — HOSPITAL ENCOUNTER (OUTPATIENT)
Facility: HOSPITAL | Age: 44
Discharge: HOME | End: 2019-04-16
Attending: OBSTETRICS & GYNECOLOGY | Admitting: OBSTETRICS & GYNECOLOGY
Payer: COMMERCIAL

## 2019-04-16 VITALS
HEART RATE: 76 BPM | BODY MASS INDEX: 33.21 KG/M2 | HEIGHT: 67 IN | SYSTOLIC BLOOD PRESSURE: 129 MMHG | OXYGEN SATURATION: 100 % | WEIGHT: 211.63 LBS | DIASTOLIC BLOOD PRESSURE: 65 MMHG

## 2019-04-16 PROCEDURE — 59025 FETAL NON-STRESS TEST: CPT | Mod: 26,,, | Performed by: OBSTETRICS & GYNECOLOGY

## 2019-04-16 PROCEDURE — 59025 PR FETAL 2N-STRESS TEST: ICD-10-PCS | Mod: 26,,, | Performed by: OBSTETRICS & GYNECOLOGY

## 2019-04-16 PROCEDURE — 59025 FETAL NON-STRESS TEST: CPT

## 2019-04-16 NOTE — PROGRESS NOTES
Pt for nst for advanced maternal age  nst reactive per protocol with accelertions of fhr 15 beast for 15 seconds throughout  No decelerations  Assessmetn reactive nst  Plan, fu as planned

## 2019-04-16 NOTE — NURSING
0900- Pt presented to unit for scheduled NST. Pt attached to EFM and VS taken. Will monitor strip and call Dr. Wiedemann w/ results.     0930- Dr. Wiedemann paged and updated on pt strip. Baseline FHTs 130's with moderate variability and positive for accels. No contractions noted. Okay to discharge per MD.

## 2019-04-18 ENCOUNTER — PROCEDURE VISIT (OUTPATIENT)
Dept: MATERNAL FETAL MEDICINE | Facility: HOSPITAL | Age: 44
End: 2019-04-18
Payer: COMMERCIAL

## 2019-04-18 VITALS — SYSTOLIC BLOOD PRESSURE: 118 MMHG | WEIGHT: 201 LBS | BODY MASS INDEX: 31.48 KG/M2 | DIASTOLIC BLOOD PRESSURE: 70 MMHG

## 2019-04-18 DIAGNOSIS — O24.410 DIET CONTROLLED GESTATIONAL DIABETES MELLITUS (GDM) IN THIRD TRIMESTER: ICD-10-CM

## 2019-04-18 DIAGNOSIS — O09.523 ELDERLY MULTIGRAVIDA IN THIRD TRIMESTER: ICD-10-CM

## 2019-04-18 PROCEDURE — 76816 PR  US,PREGNANT UTERUS,F/U,TRANSABD APP: ICD-10-PCS | Mod: 26,,, | Performed by: OBSTETRICS & GYNECOLOGY

## 2019-04-18 PROCEDURE — 76819 PR US, OB, FETAL BIOPHYSICAL, W/O NST: ICD-10-PCS | Mod: 26,,, | Performed by: OBSTETRICS & GYNECOLOGY

## 2019-04-18 PROCEDURE — 76819 FETAL BIOPHYS PROFIL W/O NST: CPT

## 2019-04-18 PROCEDURE — 99213 OFFICE O/P EST LOW 20 MIN: CPT | Mod: 25,,, | Performed by: OBSTETRICS & GYNECOLOGY

## 2019-04-18 PROCEDURE — 76816 OB US FOLLOW-UP PER FETUS: CPT | Mod: 26,,, | Performed by: OBSTETRICS & GYNECOLOGY

## 2019-04-18 PROCEDURE — 76819 FETAL BIOPHYS PROFIL W/O NST: CPT | Mod: 26,,, | Performed by: OBSTETRICS & GYNECOLOGY

## 2019-04-18 PROCEDURE — 99213 PR OFFICE/OUTPT VISIT, EST, LEVL III, 20-29 MIN: ICD-10-PCS | Mod: 25,,, | Performed by: OBSTETRICS & GYNECOLOGY

## 2019-04-18 PROCEDURE — 76816 OB US FOLLOW-UP PER FETUS: CPT

## 2019-04-18 NOTE — PROGRESS NOTES
Indication  ========    Evaluation of fetal growth/BPP/AMA/GDM.    History  ======    General History  Other: AMA: cell free DNA screen negative  Fetus: multiple EIF's  recurrent pregnancy loss  C/S x 3  Previous Outcomes  Preg. no. 1  Outcome: Live YOB: 2004  Gest. age 39 w + 5 d  Gender: male  Details: C/S FTP: 7lb 1 oz  Preg. no. 2  Outcome: Live YOB: 2008  Gest. age 39 w + 0 d  Gender: female  Details: 10 lbs; C/S  Preg. no. 3  Outcome: Live YOB: 2011  Gest. age 39 w + 0 d  Gender: male  Details: 8 lbs; C/S  Preg. no. 4  Outcome: Spontaneous miscarriage  Gest. age 8 w + 0 d  Details: D& C  Preg. no. 5  Outcome: Spontaneous miscarriage  Gest. age 7 w + 0 d  Preg. no. 6  Outcome: Spontaneous miscarriage  Gest. age 7 w + 0 d  Details: Blighted Ovum  Preg. no. 7  Outcome: Spontaneous miscarriage  Gest. age 7 w + 0 d  Details: D& C   8  Para 3  [facility] children born living (T) 3  [facility] children born (T) 3  Abortions (A) 4  [facility] living children (L) 3  Miscarriages 4  Risk Factors  History risk factors: AMA    Pregnancy History  ==============    Maternal Lab Tests  Test: Cell Free DNA Testing  Result: Materni T21: Negative  Wants to know gender: yes    Maternal Assessment  =================    BP syst 118 mmHg  BP diast 70 mmHg    Method  ======    2D Color Doppler, , Zuleika iU22, Transabdominal ultrasound examination. View: Good view.    Pregnancy  =========    [facility] pregnancy. Number of fetuses: 1.    Dating  ======    Cycle: regular cycle  Ultrasound examination on: 2019  GA by U/S based upon: AC, BPD, Femur, HC  GA by U/S 35 w + 5 d  GRICEL by U/S: 2019  Assigned: Dating performed on 10/10/2018, based on ultrasound (CR[facility])  Assigned GA 33 w + 5 d  Assigned GRICEL: 2019    General Evaluation  ==============    Cardiac activity: present.  bpm.  Fetal movements: visualized.  Presentation: cephalic.  Placenta:  Placental site: left  lateral.  Umbilical cord: Cord vessels: 3 vessel cord.  Amniotic fluid: Amount of AF: normal amount. MVP 7.7 cm.    Biophysical Profile  ==============    2: Fetal breathing movements  2: Gross body movements  2: Fetal tone  2: Amniotic fluid volume  8/8: Biophysical profile score  Interpretation: normal    Fetal Biometry  ============    Fetal Biometry  BPD 90.5 mm 36w 5d Hadlock  .8 mm 36w 1d Hadlock  .0 mm 35w 5d Hadlock  Femur 66.0 mm 34w 0d Hadlock  EFW 2,673 g 54% Robin  Calculated by: Hadlock (BPD-HC-AC-FL)  EFW (lb) 5 lb  EFW (oz) 14 oz  HC / AC 1.01  FL / BPD 0.73  FL / AC 0.21  MVP 7.7 cm   bpm    Fetal Anatomy  ===========    Cranium: normal  Posterior fossa: normal  4-chamber view: normal  Stomach: normal  Kidneys: normal  Bladder: normal  Gender: male  Wants to know gender: yes  Other: A full anatomy survey previously performed.    Consultation  ==========    118/70  +FM. No VB/LOF. Occasional contractions.  PTL precautions reviewed.  Has CGM-values reviewed. Majority wnl. No highs > 150; never < 50.  Recommend patient continue diet and exercise.  Has concerns re: delivery timing. States at last c/s she had what sounds like uterine window. She obtained her operative report from [facility] and  gave to Dr. Wiedemann. If uterine window present at last c/s, would recommend proceeding with repeat CD at 36-37 weeks due to risk of  uterine rupture. Please contact [facility] with questions. Dr. Wiedemann messaged in EPIC.  Continue weekly testing with primary OB.  Time  I overall spent approximately 15 minutes in face to face time with the patient and her family, greater than 50% of which was in counseling and  care coordination.    Impression  =========    Fetal size is AGA with the EFW plotting at the 54th percentile. AC measures at the 95th percentile for gestational age.  A limited repeat fetal anatomic survey is normal.  The BPP score is reassuring at 8/8, and the MVP is  normal.    Recommendation  ==============    Recommend weekly antepartum testing with primary OB (NST+AFV).  Continue diet and exercise.  Please re-consult as clinically indicated.  Delivery timing depends on review of medical records from [facility]-patient gave to Dr. Wiedemann. If there is presence of uterine window or  dehiscence at last CD, recommend delivery via repeat CD at 36-37 weeks.

## 2019-04-29 ENCOUNTER — PATIENT MESSAGE (OUTPATIENT)
Dept: OBSTETRICS AND GYNECOLOGY | Facility: CLINIC | Age: 44
End: 2019-04-29

## 2019-04-29 ENCOUNTER — ROUTINE PRENATAL (OUTPATIENT)
Dept: OBSTETRICS AND GYNECOLOGY | Facility: CLINIC | Age: 44
End: 2019-04-29
Payer: COMMERCIAL

## 2019-04-29 VITALS — SYSTOLIC BLOOD PRESSURE: 110 MMHG | WEIGHT: 202.5 LBS | BODY MASS INDEX: 31.71 KG/M2 | DIASTOLIC BLOOD PRESSURE: 62 MMHG

## 2019-04-29 DIAGNOSIS — Z3A.35 35 WEEKS GESTATION OF PREGNANCY: Primary | ICD-10-CM

## 2019-04-29 PROCEDURE — 0502F SUBSEQUENT PRENATAL CARE: CPT | Mod: CPTII,S$GLB,, | Performed by: OBSTETRICS & GYNECOLOGY

## 2019-04-29 PROCEDURE — 87081 CULTURE SCREEN ONLY: CPT

## 2019-04-29 PROCEDURE — 0502F PR SUBSEQUENT PRENATAL CARE: ICD-10-PCS | Mod: CPTII,S$GLB,, | Performed by: OBSTETRICS & GYNECOLOGY

## 2019-04-29 PROCEDURE — 99999 PR PBB SHADOW E&M-EST. PATIENT-LVL II: ICD-10-PCS | Mod: PBBFAC,,, | Performed by: OBSTETRICS & GYNECOLOGY

## 2019-04-29 PROCEDURE — 99999 PR PBB SHADOW E&M-EST. PATIENT-LVL II: CPT | Mod: PBBFAC,,, | Performed by: OBSTETRICS & GYNECOLOGY

## 2019-05-02 ENCOUNTER — HOSPITAL ENCOUNTER (OUTPATIENT)
Dept: OBSTETRICS AND GYNECOLOGY | Facility: HOSPITAL | Age: 44
Discharge: HOME | End: 2019-05-02
Attending: OBSTETRICS & GYNECOLOGY
Payer: COMMERCIAL

## 2019-05-02 ENCOUNTER — TELEPHONE (OUTPATIENT)
Dept: OBSTETRICS AND GYNECOLOGY | Facility: CLINIC | Age: 44
End: 2019-05-02

## 2019-05-02 DIAGNOSIS — O09.523 ELDERLY MULTIGRAVIDA IN THIRD TRIMESTER: ICD-10-CM

## 2019-05-02 LAB — BACTERIA SPEC AEROBE CULT: NORMAL

## 2019-05-02 PROCEDURE — 59025 FETAL NON-STRESS TEST: CPT | Mod: 26,,, | Performed by: OBSTETRICS & GYNECOLOGY

## 2019-05-02 PROCEDURE — 59025 PR FETAL 2N-STRESS TEST: ICD-10-PCS | Mod: 26,,, | Performed by: OBSTETRICS & GYNECOLOGY

## 2019-05-02 PROCEDURE — 59025 FETAL NON-STRESS TEST: CPT

## 2019-05-02 NOTE — TELEPHONE ENCOUNTER
----- Message from Michael A. Wiedemann, MD sent at 5/2/2019  2:30 PM CDT -----  Your notes from Monday says you told her to come at 10 but didn't put her on schedule, go ahead and put, she knows to come  (she was asking me why it wanst there) thanks

## 2019-05-06 ENCOUNTER — ROUTINE PRENATAL (OUTPATIENT)
Dept: OBSTETRICS AND GYNECOLOGY | Facility: CLINIC | Age: 44
End: 2019-05-06
Payer: COMMERCIAL

## 2019-05-06 VITALS
DIASTOLIC BLOOD PRESSURE: 68 MMHG | WEIGHT: 205.38 LBS | SYSTOLIC BLOOD PRESSURE: 115 MMHG | BODY MASS INDEX: 32.16 KG/M2

## 2019-05-06 DIAGNOSIS — Z3A.36 36 WEEKS GESTATION OF PREGNANCY: Primary | ICD-10-CM

## 2019-05-06 PROCEDURE — 0502F PR SUBSEQUENT PRENATAL CARE: ICD-10-PCS | Mod: CPTII,S$GLB,, | Performed by: OBSTETRICS & GYNECOLOGY

## 2019-05-06 PROCEDURE — 99999 PR PBB SHADOW E&M-EST. PATIENT-LVL II: CPT | Mod: PBBFAC,,, | Performed by: OBSTETRICS & GYNECOLOGY

## 2019-05-06 PROCEDURE — 0502F SUBSEQUENT PRENATAL CARE: CPT | Mod: CPTII,S$GLB,, | Performed by: OBSTETRICS & GYNECOLOGY

## 2019-05-06 PROCEDURE — 99999 PR PBB SHADOW E&M-EST. PATIENT-LVL II: ICD-10-PCS | Mod: PBBFAC,,, | Performed by: OBSTETRICS & GYNECOLOGY

## 2019-05-06 NOTE — PROGRESS NOTES
fht good, cx soft, closed  Baby actvie  No co  Uterus sfot  Prev lower segment thin  Letcher,  Take baby 20th

## 2019-05-09 ENCOUNTER — HOSPITAL ENCOUNTER (OUTPATIENT)
Dept: OBSTETRICS AND GYNECOLOGY | Facility: HOSPITAL | Age: 44
Discharge: HOME | End: 2019-05-09
Attending: OBSTETRICS & GYNECOLOGY
Payer: COMMERCIAL

## 2019-05-09 DIAGNOSIS — O09.523 ELDERLY MULTIGRAVIDA IN THIRD TRIMESTER: ICD-10-CM

## 2019-05-09 PROCEDURE — 59025 FETAL NON-STRESS TEST: CPT | Mod: 26,,, | Performed by: OBSTETRICS & GYNECOLOGY

## 2019-05-09 PROCEDURE — 59025 FETAL NON-STRESS TEST: CPT

## 2019-05-09 PROCEDURE — 59025 PR FETAL 2N-STRESS TEST: ICD-10-PCS | Mod: 26,,, | Performed by: OBSTETRICS & GYNECOLOGY

## 2019-05-13 ENCOUNTER — HOSPITAL ENCOUNTER (INPATIENT)
Facility: HOSPITAL | Age: 44
LOS: 4 days | Discharge: HOME | End: 2019-05-17
Attending: OBSTETRICS & GYNECOLOGY | Admitting: OBSTETRICS & GYNECOLOGY
Payer: COMMERCIAL

## 2019-05-13 ENCOUNTER — ROUTINE PRENATAL (OUTPATIENT)
Dept: OBSTETRICS AND GYNECOLOGY | Facility: CLINIC | Age: 44
End: 2019-05-13
Payer: COMMERCIAL

## 2019-05-13 VITALS
DIASTOLIC BLOOD PRESSURE: 70 MMHG | BODY MASS INDEX: 32.28 KG/M2 | SYSTOLIC BLOOD PRESSURE: 104 MMHG | WEIGHT: 206.13 LBS

## 2019-05-13 DIAGNOSIS — Z3A.37 37 WEEKS GESTATION OF PREGNANCY: Primary | ICD-10-CM

## 2019-05-13 DIAGNOSIS — Z36.89 ENCOUNTER FOR TRIAGE IN PREGNANT PATIENT: ICD-10-CM

## 2019-05-13 PROCEDURE — 0502F PR SUBSEQUENT PRENATAL CARE: ICD-10-PCS | Mod: CPTII,S$GLB,, | Performed by: OBSTETRICS & GYNECOLOGY

## 2019-05-13 PROCEDURE — 99999 PR PBB SHADOW E&M-EST. PATIENT-LVL II: CPT | Mod: PBBFAC,,, | Performed by: OBSTETRICS & GYNECOLOGY

## 2019-05-13 PROCEDURE — 99999 PR PBB SHADOW E&M-EST. PATIENT-LVL II: ICD-10-PCS | Mod: PBBFAC,,, | Performed by: OBSTETRICS & GYNECOLOGY

## 2019-05-13 PROCEDURE — 25000003 PHARM REV CODE 250: Performed by: OBSTETRICS & GYNECOLOGY

## 2019-05-13 PROCEDURE — 0502F SUBSEQUENT PRENATAL CARE: CPT | Mod: CPTII,S$GLB,, | Performed by: OBSTETRICS & GYNECOLOGY

## 2019-05-13 RX ORDER — SODIUM CHLORIDE, SODIUM LACTATE, POTASSIUM CHLORIDE, CALCIUM CHLORIDE 600; 310; 30; 20 MG/100ML; MG/100ML; MG/100ML; MG/100ML
INJECTION, SOLUTION INTRAVENOUS CONTINUOUS
Status: DISCONTINUED | OUTPATIENT
Start: 2019-05-14 | End: 2019-05-17 | Stop reason: HOSPADM

## 2019-05-13 RX ADMIN — SODIUM CHLORIDE, SODIUM LACTATE, POTASSIUM CHLORIDE, AND CALCIUM CHLORIDE 1000 ML: .6; .31; .03; .02 INJECTION, SOLUTION INTRAVENOUS at 11:05

## 2019-05-13 NOTE — PROGRESS NOTES
fht good, audible reactive with activity!no co  Uterus soft, enourage rest if needed  Rpt section Monday  nst thur

## 2019-05-14 ENCOUNTER — ANESTHESIA EVENT (OUTPATIENT)
Dept: OBSTETRICS AND GYNECOLOGY | Facility: HOSPITAL | Age: 44
End: 2019-05-14
Payer: COMMERCIAL

## 2019-05-14 LAB
ABO + RH BLD: NORMAL
ALBUMIN SERPL BCP-MCNC: 2.5 G/DL (ref 3.5–5.2)
ALP SERPL-CCNC: 172 U/L (ref 55–135)
ALT SERPL W/O P-5'-P-CCNC: 9 U/L (ref 10–44)
ANION GAP SERPL CALC-SCNC: 4 MMOL/L (ref 8–16)
AST SERPL-CCNC: 20 U/L (ref 10–40)
BASOPHILS # BLD AUTO: 0.01 K/UL (ref 0–0.2)
BASOPHILS # BLD AUTO: 0.01 K/UL (ref 0–0.2)
BASOPHILS NFR BLD: 0.2 % (ref 0–1.9)
BASOPHILS NFR BLD: 0.2 % (ref 0–1.9)
BILIRUB SERPL-MCNC: 0.3 MG/DL (ref 0.1–1)
BLD GP AB SCN CELLS X3 SERPL QL: NORMAL
BUN SERPL-MCNC: 6 MG/DL (ref 6–20)
CALCIUM SERPL-MCNC: 9.4 MG/DL (ref 8.7–10.5)
CHLORIDE SERPL-SCNC: 106 MMOL/L (ref 95–110)
CO2 SERPL-SCNC: 26 MMOL/L (ref 23–29)
CREAT SERPL-MCNC: 0.5 MG/DL (ref 0.5–1.4)
CREAT UR-MCNC: 254.1 MG/DL (ref 15–325)
DIFFERENTIAL METHOD: ABNORMAL
DIFFERENTIAL METHOD: ABNORMAL
EOSINOPHIL # BLD AUTO: 0.1 K/UL (ref 0–0.5)
EOSINOPHIL # BLD AUTO: 0.1 K/UL (ref 0–0.5)
EOSINOPHIL NFR BLD: 1.7 % (ref 0–8)
EOSINOPHIL NFR BLD: 2.2 % (ref 0–8)
ERYTHROCYTE [DISTWIDTH] IN BLOOD BY AUTOMATED COUNT: 16.1 % (ref 11.5–14.5)
ERYTHROCYTE [DISTWIDTH] IN BLOOD BY AUTOMATED COUNT: 16.2 % (ref 11.5–14.5)
EST. GFR  (AFRICAN AMERICAN): >60 ML/MIN/1.73 M^2
EST. GFR  (NON AFRICAN AMERICAN): >60 ML/MIN/1.73 M^2
GLUCOSE SERPL-MCNC: 103 MG/DL (ref 70–110)
HCT VFR BLD AUTO: 32.1 % (ref 37–48.5)
HCT VFR BLD AUTO: 32.7 % (ref 37–48.5)
HGB BLD-MCNC: 10.1 G/DL (ref 12–16)
HGB BLD-MCNC: 9.7 G/DL (ref 12–16)
LYMPHOCYTES # BLD AUTO: 1.7 K/UL (ref 1–4.8)
LYMPHOCYTES # BLD AUTO: 1.7 K/UL (ref 1–4.8)
LYMPHOCYTES NFR BLD: 31.6 % (ref 18–48)
LYMPHOCYTES NFR BLD: 35.1 % (ref 18–48)
MCH RBC QN AUTO: 24.3 PG (ref 27–31)
MCH RBC QN AUTO: 24.8 PG (ref 27–31)
MCHC RBC AUTO-ENTMCNC: 30.2 G/DL (ref 32–36)
MCHC RBC AUTO-ENTMCNC: 30.9 G/DL (ref 32–36)
MCV RBC AUTO: 80 FL (ref 82–98)
MCV RBC AUTO: 81 FL (ref 82–98)
MONOCYTES # BLD AUTO: 0.5 K/UL (ref 0.3–1)
MONOCYTES # BLD AUTO: 0.6 K/UL (ref 0.3–1)
MONOCYTES NFR BLD: 12.8 % (ref 4–15)
MONOCYTES NFR BLD: 9.1 % (ref 4–15)
NEUTROPHILS # BLD AUTO: 2.3 K/UL (ref 1.8–7.7)
NEUTROPHILS # BLD AUTO: 3.1 K/UL (ref 1.8–7.7)
NEUTROPHILS NFR BLD: 50.2 % (ref 38–73)
NEUTROPHILS NFR BLD: 56.5 % (ref 38–73)
PLATELET # BLD AUTO: 128 K/UL (ref 150–350)
PLATELET # BLD AUTO: 306 K/UL (ref 150–350)
PLATELET BLD QL SMEAR: ABNORMAL
PMV BLD AUTO: 9.1 FL (ref 9.2–12.9)
PMV BLD AUTO: 9.8 FL (ref 9.2–12.9)
POTASSIUM SERPL-SCNC: 4.4 MMOL/L (ref 3.5–5.1)
PROT SERPL-MCNC: 6 G/DL (ref 6–8.4)
PROT UR-MCNC: 53 MG/DL (ref 0–15)
PROT/CREAT UR: 0.21 MG/G{CREAT} (ref 0–0.2)
RBC # BLD AUTO: 3.99 M/UL (ref 4–5.4)
RBC # BLD AUTO: 4.07 M/UL (ref 4–5.4)
SODIUM SERPL-SCNC: 136 MMOL/L (ref 136–145)
WBC # BLD AUTO: 4.7 K/UL (ref 3.9–12.7)
WBC # BLD AUTO: 5.41 K/UL (ref 3.9–12.7)

## 2019-05-14 PROCEDURE — 84156 ASSAY OF PROTEIN URINE: CPT

## 2019-05-14 PROCEDURE — 63600175 PHARM REV CODE 636 W HCPCS: Performed by: ANESTHESIOLOGY

## 2019-05-14 PROCEDURE — 63600175 PHARM REV CODE 636 W HCPCS: Performed by: OBSTETRICS & GYNECOLOGY

## 2019-05-14 PROCEDURE — 88302 TISSUE EXAM BY PATHOLOGIST: CPT | Performed by: PATHOLOGY

## 2019-05-14 PROCEDURE — 27201121 HC TRAY,SPINAL-HYPERBARIC: Performed by: ANESTHESIOLOGY

## 2019-05-14 PROCEDURE — 63600175 PHARM REV CODE 636 W HCPCS: Performed by: STUDENT IN AN ORGANIZED HEALTH CARE EDUCATION/TRAINING PROGRAM

## 2019-05-14 PROCEDURE — 80053 COMPREHEN METABOLIC PANEL: CPT

## 2019-05-14 PROCEDURE — 59510 CESAREAN DELIVERY: CPT | Mod: AT,,, | Performed by: OBSTETRICS & GYNECOLOGY

## 2019-05-14 PROCEDURE — 25000003 PHARM REV CODE 250: Performed by: ANESTHESIOLOGY

## 2019-05-14 PROCEDURE — 88302 TISSUE EXAM BY PATHOLOGIST: CPT | Mod: 26,,, | Performed by: PATHOLOGY

## 2019-05-14 PROCEDURE — 36415 COLL VENOUS BLD VENIPUNCTURE: CPT

## 2019-05-14 PROCEDURE — 59510 PR FULL ROUT OBSTE CARE,CESAREAN DELIV: ICD-10-PCS | Mod: AT,,, | Performed by: OBSTETRICS & GYNECOLOGY

## 2019-05-14 PROCEDURE — 25000003 PHARM REV CODE 250: Performed by: OBSTETRICS & GYNECOLOGY

## 2019-05-14 PROCEDURE — 58611 PR LIGATION,FALLOPIAN TUBE W/C-SECTION: ICD-10-PCS | Mod: ,,, | Performed by: OBSTETRICS & GYNECOLOGY

## 2019-05-14 PROCEDURE — 37000009 HC ANESTHESIA EA ADD 15 MINS: Performed by: OBSTETRICS & GYNECOLOGY

## 2019-05-14 PROCEDURE — 85025 COMPLETE CBC W/AUTO DIFF WBC: CPT | Mod: 91

## 2019-05-14 PROCEDURE — 25000003 PHARM REV CODE 250: Performed by: STUDENT IN AN ORGANIZED HEALTH CARE EDUCATION/TRAINING PROGRAM

## 2019-05-14 PROCEDURE — 11000001 HC ACUTE MED/SURG PRIVATE ROOM

## 2019-05-14 PROCEDURE — 88302 TISSUE SPECIMEN TO PATHOLOGY - SURGERY: ICD-10-PCS | Mod: 26,,, | Performed by: PATHOLOGY

## 2019-05-14 PROCEDURE — 86901 BLOOD TYPING SEROLOGIC RH(D): CPT

## 2019-05-14 PROCEDURE — 62322 NJX INTERLAMINAR LMBR/SAC: CPT | Performed by: ANESTHESIOLOGY

## 2019-05-14 PROCEDURE — 58611 LIGATE OVIDUCT(S) ADD-ON: CPT | Mod: ,,, | Performed by: OBSTETRICS & GYNECOLOGY

## 2019-05-14 PROCEDURE — 37000008 HC ANESTHESIA 1ST 15 MINUTES: Performed by: OBSTETRICS & GYNECOLOGY

## 2019-05-14 PROCEDURE — 51702 INSERT TEMP BLADDER CATH: CPT

## 2019-05-14 PROCEDURE — 36000679 HC C/S TUBAL LIGATION, UNSCHEDULED

## 2019-05-14 RX ORDER — SODIUM CITRATE AND CITRIC ACID MONOHYDRATE 334; 500 MG/5ML; MG/5ML
30 SOLUTION ORAL
Status: DISCONTINUED | OUTPATIENT
Start: 2019-05-14 | End: 2019-05-14

## 2019-05-14 RX ORDER — KETOROLAC TROMETHAMINE 30 MG/ML
INJECTION, SOLUTION INTRAMUSCULAR; INTRAVENOUS
Status: DISCONTINUED | OUTPATIENT
Start: 2019-05-14 | End: 2019-05-14

## 2019-05-14 RX ORDER — OXYTOCIN/RINGER'S LACTATE 20/1000 ML
41.65 PLASTIC BAG, INJECTION (ML) INTRAVENOUS CONTINUOUS
Status: DISCONTINUED | OUTPATIENT
Start: 2019-05-14 | End: 2019-05-14

## 2019-05-14 RX ORDER — MUPIROCIN 20 MG/G
1 OINTMENT TOPICAL 2 TIMES DAILY
Status: DISCONTINUED | OUTPATIENT
Start: 2019-05-14 | End: 2019-05-17 | Stop reason: HOSPADM

## 2019-05-14 RX ORDER — ONDANSETRON HYDROCHLORIDE 2 MG/ML
INJECTION, SOLUTION INTRAMUSCULAR; INTRAVENOUS
Status: DISCONTINUED | OUTPATIENT
Start: 2019-05-14 | End: 2019-05-14

## 2019-05-14 RX ORDER — KETOROLAC TROMETHAMINE 30 MG/ML
30 INJECTION, SOLUTION INTRAMUSCULAR; INTRAVENOUS EVERY 6 HOURS
Status: DISCONTINUED | OUTPATIENT
Start: 2019-05-14 | End: 2019-05-15

## 2019-05-14 RX ORDER — MISOPROSTOL 200 UG/1
800 TABLET ORAL
Status: DISCONTINUED | OUTPATIENT
Start: 2019-05-14 | End: 2019-05-14

## 2019-05-14 RX ORDER — MUPIROCIN 20 MG/G
OINTMENT TOPICAL
Status: DISCONTINUED | OUTPATIENT
Start: 2019-05-14 | End: 2019-05-14

## 2019-05-14 RX ORDER — OXYCODONE HYDROCHLORIDE 5 MG/1
5 TABLET ORAL EVERY 4 HOURS PRN
Status: DISCONTINUED | OUTPATIENT
Start: 2019-05-14 | End: 2019-05-17 | Stop reason: HOSPADM

## 2019-05-14 RX ORDER — CEFAZOLIN SODIUM 2 G/50ML
2 SOLUTION INTRAVENOUS
Status: COMPLETED | OUTPATIENT
Start: 2019-05-14 | End: 2019-05-14

## 2019-05-14 RX ORDER — OXYCODONE HYDROCHLORIDE 5 MG/1
10 TABLET ORAL EVERY 4 HOURS PRN
Status: DISCONTINUED | OUTPATIENT
Start: 2019-05-14 | End: 2019-05-17 | Stop reason: HOSPADM

## 2019-05-14 RX ORDER — ONDANSETRON 8 MG/1
8 TABLET, ORALLY DISINTEGRATING ORAL EVERY 8 HOURS PRN
Status: DISCONTINUED | OUTPATIENT
Start: 2019-05-14 | End: 2019-05-17 | Stop reason: HOSPADM

## 2019-05-14 RX ORDER — LABETALOL 200 MG/1
200 TABLET, FILM COATED ORAL EVERY 12 HOURS
Status: DISCONTINUED | OUTPATIENT
Start: 2019-05-14 | End: 2019-05-17 | Stop reason: HOSPADM

## 2019-05-14 RX ORDER — SODIUM CHLORIDE, SODIUM LACTATE, POTASSIUM CHLORIDE, CALCIUM CHLORIDE 600; 310; 30; 20 MG/100ML; MG/100ML; MG/100ML; MG/100ML
INJECTION, SOLUTION INTRAVENOUS CONTINUOUS
Status: DISCONTINUED | OUTPATIENT
Start: 2019-05-14 | End: 2019-05-14

## 2019-05-14 RX ORDER — MORPHINE SULFATE 0.5 MG/ML
INJECTION, SOLUTION EPIDURAL; INTRATHECAL; INTRAVENOUS
Status: COMPLETED | OUTPATIENT
Start: 2019-05-14 | End: 2019-05-14

## 2019-05-14 RX ORDER — BUPIVACAINE HYDROCHLORIDE 7.5 MG/ML
INJECTION, SOLUTION EPIDURAL; RETROBULBAR
Status: COMPLETED | OUTPATIENT
Start: 2019-05-14 | End: 2019-05-14

## 2019-05-14 RX ORDER — PHENYLEPHRINE HYDROCHLORIDE 10 MG/ML
INJECTION INTRAVENOUS
Status: DISCONTINUED | OUTPATIENT
Start: 2019-05-14 | End: 2019-05-14

## 2019-05-14 RX ORDER — ONDANSETRON 2 MG/ML
4 INJECTION INTRAMUSCULAR; INTRAVENOUS EVERY 6 HOURS PRN
Status: DISCONTINUED | OUTPATIENT
Start: 2019-05-14 | End: 2019-05-17 | Stop reason: HOSPADM

## 2019-05-14 RX ORDER — ACETAMINOPHEN 325 MG/1
650 TABLET ORAL EVERY 6 HOURS
Status: DISPENSED | OUTPATIENT
Start: 2019-05-14 | End: 2019-05-15

## 2019-05-14 RX ORDER — OXYTOCIN/RINGER'S LACTATE 20/1000 ML
41.7 PLASTIC BAG, INJECTION (ML) INTRAVENOUS CONTINUOUS
Status: DISCONTINUED | OUTPATIENT
Start: 2019-05-14 | End: 2019-05-14

## 2019-05-14 RX ORDER — OXYTOCIN/RINGER'S LACTATE 20/1000 ML
333 PLASTIC BAG, INJECTION (ML) INTRAVENOUS CONTINUOUS
Status: DISCONTINUED | OUTPATIENT
Start: 2019-05-14 | End: 2019-05-14

## 2019-05-14 RX ORDER — DIPHENHYDRAMINE HCL 25 MG
25 CAPSULE ORAL EVERY 4 HOURS PRN
Status: DISCONTINUED | OUTPATIENT
Start: 2019-05-14 | End: 2019-05-17 | Stop reason: HOSPADM

## 2019-05-14 RX ORDER — MORPHINE SULFATE 4 MG/ML
2 INJECTION, SOLUTION INTRAMUSCULAR; INTRAVENOUS
Status: DISCONTINUED | OUTPATIENT
Start: 2019-05-14 | End: 2019-05-15

## 2019-05-14 RX ORDER — FENTANYL CITRATE 50 UG/ML
INJECTION, SOLUTION INTRAMUSCULAR; INTRAVENOUS
Status: DISCONTINUED | OUTPATIENT
Start: 2019-05-14 | End: 2019-05-14

## 2019-05-14 RX ORDER — SODIUM CHLORIDE, SODIUM LACTATE, POTASSIUM CHLORIDE, CALCIUM CHLORIDE 600; 310; 30; 20 MG/100ML; MG/100ML; MG/100ML; MG/100ML
INJECTION, SOLUTION INTRAVENOUS CONTINUOUS PRN
Status: DISCONTINUED | OUTPATIENT
Start: 2019-05-14 | End: 2019-05-14

## 2019-05-14 RX ORDER — IBUPROFEN 400 MG/1
400 TABLET ORAL EVERY 6 HOURS PRN
Status: DISCONTINUED | OUTPATIENT
Start: 2019-05-14 | End: 2019-05-17 | Stop reason: HOSPADM

## 2019-05-14 RX ORDER — OXYTOCIN 10 [USP'U]/ML
INJECTION, SOLUTION INTRAMUSCULAR; INTRAVENOUS
Status: DISCONTINUED | OUTPATIENT
Start: 2019-05-14 | End: 2019-05-14

## 2019-05-14 RX ADMIN — Medication 20 UNITS: at 02:05

## 2019-05-14 RX ADMIN — KETOROLAC TROMETHAMINE 60 MG: 30 INJECTION, SOLUTION INTRAMUSCULAR; INTRAVENOUS at 02:05

## 2019-05-14 RX ADMIN — PROMETHAZINE HYDROCHLORIDE 12.5 MG: 25 INJECTION INTRAMUSCULAR; INTRAVENOUS at 05:05

## 2019-05-14 RX ADMIN — SODIUM CITRATE AND CITRIC ACID MONOHYDRATE 30 ML: 500; 334 SOLUTION ORAL at 12:05

## 2019-05-14 RX ADMIN — BUPIVACAINE HYDROCHLORIDE 1.6 ML: 7.5 INJECTION, SOLUTION EPIDURAL; RETROBULBAR at 02:05

## 2019-05-14 RX ADMIN — KETOROLAC TROMETHAMINE 30 MG: 30 INJECTION, SOLUTION INTRAMUSCULAR at 06:05

## 2019-05-14 RX ADMIN — OXYCODONE HYDROCHLORIDE 10 MG: 5 TABLET ORAL at 06:05

## 2019-05-14 RX ADMIN — CEFAZOLIN SODIUM 2 G: 2 SOLUTION INTRAVENOUS at 02:05

## 2019-05-14 RX ADMIN — FENTANYL CITRATE 15 MCG: 50 INJECTION, SOLUTION INTRAMUSCULAR; INTRAVENOUS at 02:05

## 2019-05-14 RX ADMIN — LABETALOL HYDROCHLORIDE 200 MG: 200 TABLET, FILM COATED ORAL at 08:05

## 2019-05-14 RX ADMIN — MORPHINE SULFATE 2 MG: 4 INJECTION INTRAVENOUS at 07:05

## 2019-05-14 RX ADMIN — PHENYLEPHRINE HYDROCHLORIDE 100 MCG: 10 INJECTION INTRAVENOUS at 02:05

## 2019-05-14 RX ADMIN — SODIUM CHLORIDE, SODIUM LACTATE, POTASSIUM CHLORIDE, AND CALCIUM CHLORIDE: .6; .31; .03; .02 INJECTION, SOLUTION INTRAVENOUS at 12:05

## 2019-05-14 RX ADMIN — ACETAMINOPHEN 650 MG: 325 TABLET ORAL at 11:05

## 2019-05-14 RX ADMIN — ONDANSETRON 4 MG: 2 INJECTION, SOLUTION INTRAMUSCULAR; INTRAVENOUS at 02:05

## 2019-05-14 RX ADMIN — OXYTOCIN 10 UNITS: 10 INJECTION, SOLUTION INTRAMUSCULAR; INTRAVENOUS at 02:05

## 2019-05-14 RX ADMIN — MORPHINE SULFATE 2 MG: 4 INJECTION INTRAVENOUS at 08:05

## 2019-05-14 RX ADMIN — IBUPROFEN 400 MG: 400 TABLET, FILM COATED ORAL at 11:05

## 2019-05-14 RX ADMIN — MORPHINE SULFATE 0.2 MG: 0.5 INJECTION, SOLUTION EPIDURAL; INTRATHECAL; INTRAVENOUS at 02:05

## 2019-05-14 RX ADMIN — OXYCODONE HYDROCHLORIDE 10 MG: 5 TABLET ORAL at 01:05

## 2019-05-14 RX ADMIN — SODIUM CHLORIDE, SODIUM LACTATE, POTASSIUM CHLORIDE, AND CALCIUM CHLORIDE: .6; .31; .03; .02 INJECTION, SOLUTION INTRAVENOUS at 02:05

## 2019-05-14 RX ADMIN — MUPIROCIN: 20 OINTMENT TOPICAL at 12:05

## 2019-05-14 RX ADMIN — Medication 41.65 MILLI-UNITS/MIN: at 04:05

## 2019-05-14 RX ADMIN — AZITHROMYCIN MONOHYDRATE 500 MG: 500 INJECTION, POWDER, LYOPHILIZED, FOR SOLUTION INTRAVENOUS at 12:05

## 2019-05-14 RX ADMIN — ONDANSETRON 4 MG: 2 INJECTION INTRAMUSCULAR; INTRAVENOUS at 03:05

## 2019-05-14 RX ADMIN — MUPIROCIN 1 G: 20 OINTMENT TOPICAL at 09:05

## 2019-05-14 RX ADMIN — DIPHENHYDRAMINE HYDROCHLORIDE 25 MG: 25 CAPSULE ORAL at 11:05

## 2019-05-14 RX ADMIN — ACETAMINOPHEN 650 MG: 325 TABLET ORAL at 06:05

## 2019-05-14 NOTE — BRIEF OP NOTE
May 14  rpeat low trans Csectoin  With tubal ligation  Clear fluid  Healthy male infant  2 gms ancef and 500 azithromycin  ebl 375  vstricker assissting miki  No complications  Pathology tubal ligation segments

## 2019-05-14 NOTE — ANESTHESIA PREPROCEDURE EVALUATION
05/14/2019  Gladis Sesay is a 44 y.o., female. In labor, 3 past C sections, getting C section    Anesthesia Evaluation    I have reviewed the Patient Summary Reports.    I have reviewed the Nursing Notes.      Review of Systems  Anesthesia Hx:  History of prior surgery of interest to airway management or planning:  Denies Personal Hx of Anesthesia complications.   Social:  Non-Smoker, No Alcohol Use    Cardiovascular:  Cardiovascular Normal Exercise tolerance: good     Pulmonary:  Pulmonary Normal    Endocrine:   Diabetes        Physical Exam  General:  Well nourished    Airway/Jaw/Neck:  Airway Findings: Mouth Opening: Normal Tongue: Normal  General Airway Assessment: Adult  Mallampati: II      Dental:  Dental Findings: In tact   Chest/Lungs:  Chest/Lungs Findings: Clear to auscultation, Normal Respiratory Rate     Heart/Vascular:  Heart Findings: Rate: Normal  Rhythm: Regular Rhythm        Mental Status:  Mental Status Findings:  Cooperative, Alert and Oriented         Anesthesia Plan  Type of Anesthesia, risks & benefits discussed:  Anesthesia Type:  spinal  Patient's Preference:   Intra-op Monitoring Plan:   Intra-op Monitoring Plan Comments:   Post Op Pain Control Plan:   Post Op Pain Control Plan Comments:   Induction:    Beta Blocker:  Patient is not currently on a Beta-Blocker (No further documentation required).       Informed Consent: Patient understands risks and agrees with Anesthesia plan.  Questions answered. Anesthesia consent signed with patient.  ASA Score: 2     Day of Surgery Review of History & Physical:    H&P update referred to the surgeon.         Ready For Surgery From Anesthesia Perspective.

## 2019-05-14 NOTE — PROGRESS NOTES
Dr. Wiedemann notified via telephone of improvement in blood pressures.  Able to see lab results from office.  States pt may be transferred with continued PO labetalol

## 2019-05-14 NOTE — NURSING
Report given to Jay GARCÍA on mother baby from Stephanie GARCÍA. Informed that pt is ready for transfer and just needs to be moved over.

## 2019-05-14 NOTE — PLAN OF CARE
"Problem: Adult Inpatient Plan of Care  Goal: Plan of Care Review  Outcome: Ongoing (interventions implemented as appropriate)  Patient arrived to unit c/o back pain and "not feeling right." After 2 hours, patient determined to be in labor. C/s called by Dr. Wiedemann. Successful delivery with BTL. Baby in nursery for observation secondary to tachypnea. Patient suffering from n/v. Zofran and phenergan given per MAR. Patient resting quietly now. Denies n/v or pain. Fundus is firm, -1, with small amount of rubra. Pericare performed and salas emptied. Preparing for transfer to [facility].      "

## 2019-05-14 NOTE — TRANSFER OF CARE
"Anesthesia Transfer of Care Note    Patient: Gladis Sesay    Procedure(s) Performed: Procedure(s) (LRB):   SECTION (N/A)    Patient location: Labor and Delivery    Anesthesia Type: spinal    Transport from OR: Transported from OR on room air with adequate spontaneous ventilation    Post pain: adequate analgesia    Post assessment: no apparent anesthetic complications    Post vital signs: stable    Level of consciousness: awake and alert    Nausea/Vomiting: no nausea/vomiting    Complications: none    Transfer of care protocol was followed      Last vitals:   Visit Vitals  /67   Pulse 74   Temp 36.6 °C (97.8 °F) (Oral)   Ht 5' 7" (1.702 m)   Wt 93 kg (205 lb 0.4 oz)   LMP 2018   SpO2 100%   Breastfeeding? No   BMI 32.11 kg/m²     "

## 2019-05-14 NOTE — ANESTHESIA PROCEDURE NOTES
Spinal    Diagnosis: C section  Patient location during procedure: OB  Start time: 5/14/2019 2:01 AM  Timeout: 5/14/2019 2:00 AM  End time: 5/14/2019 2:10 AM  Staffing  Anesthesiologist: Rima Salas MD  Performed: anesthesiologist   Preanesthetic Checklist  Completed: patient identified, site marked, surgical consent, pre-op evaluation, timeout performed, IV checked, risks and benefits discussed and monitors and equipment checked  Spinal Block  Patient position: sitting  Prep: ChloraPrep  Patient monitoring: heart rate, cardiac monitor and continuous pulse ox  Approach: midline  Location: L2-3  Injection technique: single shot  CSF Fluid: clear free-flowing CSF  Needle  Needle type: pencil-tip   Needle gauge: 25 G  Needle length: 3.5 in  Additional Documentation: incremental injection, negative aspiration for heme and no paresthesia on injection  Needle localization: anatomical landmarks  Assessment  Sensory level: T4   Dermatomal levels determined by alcohol wipe and pinch or prick  Ease of block: easy  Patient's tolerance of the procedure: comfortable throughout block

## 2019-05-14 NOTE — OP NOTE
DATE OF PROCEDURE:  2019.    SURGEON:  Michael Wiedemann, M.D.    ASSISTANT:  Alaina Jolly.    PREOPERATIVE DIAGNOSES:  Intrauterine pregnancy at 37-1/2 weeks' gestation,   previous  x3, history of thinned lower uterine segment, active labor.    POSTOPERATIVE DIAGNOSES:  Intrauterine pregnancy at 37-1/2 weeks' gestation,   previous  x3, history of thinned lower uterine segment, active labor   and a healthy infant.    PROCEDURE IN DETAIL:  Spinal anesthesia was placed; 2 g of Ancef and 500 of   azithromycin were given preoperatively.  The patient was prepped twice with   ChloraPrep and draped after drying.  The level was tested.  A Pfannenstiel   incision was made and the abdomen was opened in layers experiencing the expected   scar tissue from 3 previous C-sections.  The lower uterine segment was   extremely thin, almost transparent.  A transverse uterine incision was made,   high where there was more musculature.  Clear amniotic fluid was encountered and   a healthy infant was delivered.  The placenta was delivered without   complications.  The uterus was wiped clean, inspected.  Then, the uterine   incision was closed with a running interlocking suture.  This achieved good   approximation and hemostasis.  At this point, the entire abdomen was inspected.    It was dry and therefore we proceeded with the tubal ligation.  A segment of   tube in the midline was elevated and doubly ligated with a 2-0 plain gut suture.    A 2 cm portion was excised and the distal and proximal ends were cauterized.    This was a modified Larsen technique.  Both sides were done.  Hemostasis was in   order.  The urine output was good and clear.  All counts were correct and there   was no active bleeding.  With this, we closed using chromic suture on the   peritoneum and rectus muscle, Vicryl suture on the fascia, plain gut suture in   the subcutaneous tissue and skin staples on the skin.  There were no    complications to the section.      RAKAN  dd: 05/14/2019 13:13:02 (CDT)  td: 05/14/2019 15:39:30 (CDT)  Doc ID   #[id number]  Job ID #[id number]    CC:

## 2019-05-14 NOTE — H&P
HISTORY AND PHYSICAL    Chief Complaint: contractinos  History of Present Illness: 45 y/o with 3 prev c sections at 37-3 presented with contractions, regular, changed cx from closed to 1cm, contractinos becoming more symptomatic despite iv hydration, uncomplicated prenatal care, hx of think lower segment with last section, possible 'window', gets dm but diet controlled, no meds    Past Medical History: gest dm  Past Social History:  reports that she has never smoked. She has never used smokeless tobacco. She reports that she does not drink alcohol or use drugs.  Family History: neg  Allergies: Patient has no known allergies.   Current Medications:   Medications Prior to Admission   Medication Sig Dispense Refill Last Dose    aspirin (ECOTRIN) 81 MG EC tablet Take 81 mg by mouth once daily.   Taking    flash glucose scanning reader Misc use as directed to test blood sugar 1 each 0 Taking    flash glucose sensor (FREESTYLE MOISÉS 14 DAY SENSOR) Kit use as directed to check blood sugar four times daily 2 kit 3 Taking    PNV 12-IRON-METHYLFOLATE-DHA ORAL Take by mouth.   Taking      Review of Systems: ROS:  GENERAL: No fever, chills, fatigability or weight loss.  SKIN: No rashes, itching or changes in color or texture of skin.  HEAD: No headaches or recent head trauma.  EYES: Visual acuity fine. No photophobia, ocular pain or diplopia.  EARS: Denies ear pain, discharge or vertigo.  NOSE: No loss of smell, no epistaxis or postnasal drip.  MOUTH & THROAT: No hoarseness or change in voice. No excessive gum bleeding.  NODES: Denies swollen glands.  CHEST: Denies GROVER, cyanosis, wheezing, cough and sputum production.  CARDIOVASCULAR: Denies chest pain, PND, orthopnea or reduced exercise tolerance.  ABDOMEN: Appetite fine. No weight loss. Denies diarrhea, abdominal pain, hematemesis or blood in stool.  URINARY: No flank pain, dysuria or hematuria.  PERIPHERAL VASCULAR: No claudication or cyanosis.  MUSCULOSKELETAL: No joint  stiffness or swelling. Denies back pain.  NEUROLOGIC: No history of seizures, paralysis, alteration of gait or coordination    Physical Exam: vss, head/neck normla  Perrla, abd gravid, fht good  cx per rn, 1cm  extr normal  Assessment/Plan: iup at 37-3  Scheduled for rpt section with tubal ligation,  Presents in early labor  Hx of thin lower segment  Proceed with rpt section with tubal

## 2019-05-14 NOTE — CARE UPDATE
NNP discussing POC for baby with patient. Brought baby to bedside for mother to see briefly. Patient now resting quietly.

## 2019-05-14 NOTE — PROGRESS NOTES
Dr. Wiedemann notified via telephone that PT's last three blood pressures are greater than 160/110. Telephone orders given for PO labetalol  mg, STAT CMP and p/c ratio from josue.

## 2019-05-14 NOTE — CARE UPDATE
": Patient is a  @ 37.2 weeks IUP. Arrived to unit c/o back pain and "not feeling right." Patient taken to room, gowned, and EFM applied.    : Cervix soft, but closed upon SVE. NST reactive.    : Dr. Wiedemann called, awaiting call back.    : IV started, patient receiving 1L LR bolus.    0003: 1.5/10/3 upon SVE. Dr. Wiedemann notified, c/s called for 0100.    0050: Anesthesia at bedside consenting patient.    0100: Will roll for c/s when anesthesia finishes epidural on another patient.          "

## 2019-05-14 NOTE — NURSING
TMShony pump, tubing, collections containers and labels brought to bedside.  Discussed proper pump setting of initiation phase.  Instructed on proper usage of pump and to adjust suction according to maximum comfort level.  Verified appropriate flange fit.  Educated on the frequency and duration of pumping in order to promote and maintain a full milk supply.  Hands on pumping technique reviewed.  Encouraged hand expression after pumping.  Instructed on cleaning of breast pump parts.  Written instructions also given.  Pt verbalized understanding and appropriate recall for proper milk handling, collection, labeling, storage and transportation.

## 2019-05-14 NOTE — NURSING
Pt bladder scanned per pt request. Bladder scanner revealed 112ml of urine in bladder. Pt salas catheter removed now per pt request. Pt feels like the salas is not drainage properly. Abdomen soft and mildly distended. Educated pt on early ambulation for gas distention, verbalized understanding.

## 2019-05-14 NOTE — CARE UPDATE
Patient s/p c/s with BTL. No complications with delivery. Baby boy born at 0225 with 9/9 apgars. VSS upon return to recovery room. Will continue to monitor.

## 2019-05-15 PROCEDURE — 25000003 PHARM REV CODE 250: Performed by: OBSTETRICS & GYNECOLOGY

## 2019-05-15 PROCEDURE — 25000003 PHARM REV CODE 250: Performed by: STUDENT IN AN ORGANIZED HEALTH CARE EDUCATION/TRAINING PROGRAM

## 2019-05-15 PROCEDURE — 11000001 HC ACUTE MED/SURG PRIVATE ROOM

## 2019-05-15 RX ORDER — HYDROCORTISONE 25 MG/G
CREAM TOPICAL 3 TIMES DAILY PRN
Status: DISCONTINUED | OUTPATIENT
Start: 2019-05-15 | End: 2019-05-17 | Stop reason: HOSPADM

## 2019-05-15 RX ORDER — BISACODYL 10 MG
10 SUPPOSITORY, RECTAL RECTAL ONCE AS NEEDED
Status: COMPLETED | OUTPATIENT
Start: 2019-05-15 | End: 2019-05-15

## 2019-05-15 RX ORDER — IBUPROFEN 600 MG/1
600 TABLET ORAL EVERY 6 HOURS
Status: DISCONTINUED | OUTPATIENT
Start: 2019-05-15 | End: 2019-05-17 | Stop reason: HOSPADM

## 2019-05-15 RX ORDER — ADHESIVE BANDAGE
30 BANDAGE TOPICAL 2 TIMES DAILY PRN
Status: DISCONTINUED | OUTPATIENT
Start: 2019-05-16 | End: 2019-05-17 | Stop reason: HOSPADM

## 2019-05-15 RX ORDER — OXYCODONE AND ACETAMINOPHEN 10; 325 MG/1; MG/1
1 TABLET ORAL EVERY 4 HOURS PRN
Status: DISCONTINUED | OUTPATIENT
Start: 2019-05-15 | End: 2019-05-15

## 2019-05-15 RX ORDER — DOCUSATE SODIUM 100 MG/1
200 CAPSULE, LIQUID FILLED ORAL 2 TIMES DAILY
Status: DISCONTINUED | OUTPATIENT
Start: 2019-05-15 | End: 2019-05-17 | Stop reason: HOSPADM

## 2019-05-15 RX ORDER — MAG HYDROX/ALUMINUM HYD/SIMETH 200-200-20
30 SUSPENSION, ORAL (FINAL DOSE FORM) ORAL EVERY 6 HOURS PRN
Status: DISCONTINUED | OUTPATIENT
Start: 2019-05-15 | End: 2019-05-17 | Stop reason: HOSPADM

## 2019-05-15 RX ORDER — ACETAMINOPHEN 325 MG/1
650 TABLET ORAL EVERY 6 HOURS PRN
Status: DISCONTINUED | OUTPATIENT
Start: 2019-05-15 | End: 2019-05-17 | Stop reason: HOSPADM

## 2019-05-15 RX ORDER — OXYCODONE AND ACETAMINOPHEN 5; 325 MG/1; MG/1
1 TABLET ORAL EVERY 4 HOURS PRN
Status: DISCONTINUED | OUTPATIENT
Start: 2019-05-15 | End: 2019-05-15

## 2019-05-15 RX ORDER — AMOXICILLIN 250 MG
1 CAPSULE ORAL NIGHTLY PRN
Status: DISCONTINUED | OUTPATIENT
Start: 2019-05-15 | End: 2019-05-17 | Stop reason: HOSPADM

## 2019-05-15 RX ORDER — SIMETHICONE 80 MG
1 TABLET,CHEWABLE ORAL EVERY 6 HOURS PRN
Status: DISCONTINUED | OUTPATIENT
Start: 2019-05-15 | End: 2019-05-17 | Stop reason: HOSPADM

## 2019-05-15 RX ADMIN — IBUPROFEN 600 MG: 600 TABLET ORAL at 12:05

## 2019-05-15 RX ADMIN — DOCUSATE SODIUM 200 MG: 100 CAPSULE, LIQUID FILLED ORAL at 08:05

## 2019-05-15 RX ADMIN — MUPIROCIN 1 G: 20 OINTMENT TOPICAL at 08:05

## 2019-05-15 RX ADMIN — OXYCODONE HYDROCHLORIDE 5 MG: 5 TABLET ORAL at 03:05

## 2019-05-15 RX ADMIN — ACETAMINOPHEN 650 MG: 325 TABLET ORAL at 11:05

## 2019-05-15 RX ADMIN — ACETAMINOPHEN 650 MG: 325 TABLET ORAL at 05:05

## 2019-05-15 RX ADMIN — OXYCODONE AND ACETAMINOPHEN 1 TABLET: 10; 325 TABLET ORAL at 12:05

## 2019-05-15 RX ADMIN — IBUPROFEN 600 MG: 600 TABLET ORAL at 04:05

## 2019-05-15 RX ADMIN — IBUPROFEN 600 MG: 600 TABLET ORAL at 05:05

## 2019-05-15 RX ADMIN — ALUMINUM HYDROXIDE, MAGNESIUM HYDROXIDE, AND SIMETHICONE 30 ML: 200; 200; 20 SUSPENSION ORAL at 05:05

## 2019-05-15 RX ADMIN — IBUPROFEN 600 MG: 600 TABLET ORAL at 11:05

## 2019-05-15 RX ADMIN — SIMETHICONE CHEW TAB 80 MG 80 MG: 80 TABLET ORAL at 03:05

## 2019-05-15 RX ADMIN — Medication 10 MG: at 08:05

## 2019-05-15 RX ADMIN — ALUMINUM HYDROXIDE, MAGNESIUM HYDROXIDE, AND SIMETHICONE 30 ML: 200; 200; 20 SUSPENSION ORAL at 04:05

## 2019-05-15 NOTE — PROGRESS NOTES
"POD #1 s/p     Subjective: No complaints. No flatus    Objective: /76   Pulse 81   Temp 98.6 °F (37 °C) (Oral)   Resp 18   Ht 5' 7" (1.702 m)   Wt 93 kg (205 lb 0.4 oz)   LMP 2018   SpO2 100%   Breastfeeding? Yes   BMI 32.11 kg/m²     H/H:   Lab Results   Component Value Date    WBC 4.70 2019    HGB 9.7 (L) 2019    HCT 32.1 (L) 2019    MCV 81 (L) 2019     2019       Chest: Clear to auscultation  CV: Regular rate and rhythm  Abdomen: Non-tender, non-distended, soft, minimal positive bowel sounds  Incision: Bandaged  Extremities: Non-tender, no edema    Assessment: S/P     Plan: Routine progressive care      "

## 2019-05-15 NOTE — LACTATION NOTE
1100 Attempted to see pt, pt on telephone. No breastfeeding questions or concerns verbalized by pt,  or nurse at this time.

## 2019-05-15 NOTE — ANESTHESIA POSTPROCEDURE EVALUATION
Anesthesia Post Evaluation    Patient: Gladis Sesay    Procedure(s) Performed: Procedure(s) (LRB):   SECTION, WITH TUBAL LIGATION (N/A)    Final Anesthesia Type: spinal  Patient location: [facility].  Patient participation: Yes- Able to Participate  Level of consciousness: awake and alert and oriented  Post-procedure vital signs: reviewed and stable  Pain management: adequate  Airway patency: patent  PONV status at discharge: No PONV  Anesthetic complications: no      Cardiovascular status: hemodynamically stable  Respiratory status: room air  Hydration status: euvolemic  Follow-up not needed.          Vitals Value Taken Time   /77 5/15/2019 11:00 AM   Temp 37.2 °C (98.9 °F) 5/15/2019 11:00 AM   Pulse 81 5/15/2019 11:00 AM   Resp 18 5/15/2019 11:00 AM   SpO2 99 % 5/15/2019 11:00 AM         No case tracking events are documented in the log.      Pain/Gigi Score: Pain Rating Prior to Med Admin: 10 (5/15/2019 12:18 PM)  Pain Rating Post Med Admin: 5 (2019  2:40 PM)    No catheter in back.  No headache/neckache/backache.  Full return of neurological function.  Able to urinate, ambulate.  Advised patient to report any new problems of back pain, especially with fever or decreasing bladder function occurring during coming days to weeks.

## 2019-05-16 PROCEDURE — 11000001 HC ACUTE MED/SURG PRIVATE ROOM

## 2019-05-16 PROCEDURE — 25000003 PHARM REV CODE 250: Performed by: OBSTETRICS & GYNECOLOGY

## 2019-05-16 PROCEDURE — 25000003 PHARM REV CODE 250: Performed by: STUDENT IN AN ORGANIZED HEALTH CARE EDUCATION/TRAINING PROGRAM

## 2019-05-16 RX ADMIN — MUPIROCIN 1 G: 20 OINTMENT TOPICAL at 09:05

## 2019-05-16 RX ADMIN — ACETAMINOPHEN 650 MG: 325 TABLET ORAL at 06:05

## 2019-05-16 RX ADMIN — IBUPROFEN 600 MG: 600 TABLET ORAL at 06:05

## 2019-05-16 RX ADMIN — ACETAMINOPHEN 650 MG: 325 TABLET ORAL at 10:05

## 2019-05-16 RX ADMIN — DOCUSATE SODIUM 200 MG: 100 CAPSULE, LIQUID FILLED ORAL at 10:05

## 2019-05-16 RX ADMIN — OXYCODONE HYDROCHLORIDE 10 MG: 5 TABLET ORAL at 01:05

## 2019-05-16 RX ADMIN — DOCUSATE SODIUM 200 MG: 100 CAPSULE, LIQUID FILLED ORAL at 09:05

## 2019-05-16 RX ADMIN — IBUPROFEN 600 MG: 600 TABLET ORAL at 11:05

## 2019-05-16 RX ADMIN — MUPIROCIN 1 G: 20 OINTMENT TOPICAL at 10:05

## 2019-05-16 NOTE — PROGRESS NOTES
2019      Patient is doing well with no complaints. Tolerating Po without N/V. Passing flatus. Ambulated without difficulty. Pain controlled with pain medications. Lochia is less than menses. Is breastfeeding.     Temp:  [98.1 °F (36.7 °C)-98.9 °F (37.2 °C)] 98.6 °F (37 °C)  Pulse:  [77-83] 81  Resp:  [18-19] 18  SpO2:  [99 %-100 %] 100 %  BP: (108-125)/(61-80) 109/61      In bed, NAD, RRR, CTA B, abd S/NT/ND + BS FF and below umbilicus, dressing c/d/i  ext: no edema or tenderness     A/P: 44 y.o.   s/p RLTCD PPD 2   With anemia    1. Continue routine care  2. Restart iron when bowel function returns.

## 2019-05-16 NOTE — PLAN OF CARE
Problem: Adult Inpatient Plan of Care  Goal: Plan of Care Review  Outcome: Ongoing (interventions implemented as appropriate)  POC reviewed with pt around 0800; verbalized acceptance and understanding.  Pt's VS stable.  Remains free from falls and injury.  Pain controlled with ordered meds.  Bonding well with baby.  Baby tolerating feedings; voiding/stooling appropriately.  Family at bedside to offer support.  WCTM.

## 2019-05-17 VITALS
WEIGHT: 205 LBS | BODY MASS INDEX: 32.18 KG/M2 | RESPIRATION RATE: 18 BRPM | HEART RATE: 68 BPM | OXYGEN SATURATION: 100 % | SYSTOLIC BLOOD PRESSURE: 133 MMHG | TEMPERATURE: 99 F | HEIGHT: 67 IN | DIASTOLIC BLOOD PRESSURE: 79 MMHG

## 2019-05-17 PROCEDURE — 25000003 PHARM REV CODE 250: Performed by: OBSTETRICS & GYNECOLOGY

## 2019-05-17 RX ADMIN — IBUPROFEN 600 MG: 600 TABLET ORAL at 11:05

## 2019-05-17 RX ADMIN — IBUPROFEN 600 MG: 600 TABLET ORAL at 06:05

## 2019-05-17 RX ADMIN — DOCUSATE SODIUM 200 MG: 100 CAPSULE, LIQUID FILLED ORAL at 09:05

## 2019-05-17 RX ADMIN — MUPIROCIN 1 G: 20 OINTMENT TOPICAL at 09:05

## 2019-05-17 RX ADMIN — ACETAMINOPHEN 650 MG: 325 TABLET ORAL at 06:05

## 2019-05-17 NOTE — PLAN OF CARE
Problem: Adult Inpatient Plan of Care  Goal: Plan of Care Review  Outcome: Outcome(s) achieved Date Met: 05/17/19  Mother will exclusively breastfeed on cue 8 or more times in 24 hours. Will record voids/stools. Will monitor baby for signs of adequate feedings. Will call for assistance if needed. Family supportive at bedside.

## 2019-05-17 NOTE — DISCHARGE SUMMARY
Pt is now post op day # 3 from .  VSS are stable, no fever, she is tolerating a regular diet, voiding well, ambulating and passing flatus.  Her oral pain meds are adequate for pain control.   Exam; breathing normally, abdomen is soft, bowel sounds normal, uterus firm and below the umbilicus.  Minimal surgical tenderness only. The incision is healing well, intact without any erythema or drainage. Her extremities are normal with no abnormal color or edema.   Ass; clinically doing well   Plan, In light of her good clinical status, she is being discharge to home today in good condion, on a regular diet.  She is to have limited activity with light ambulation and shower for one week.  Normal care for herself and the baby is fine, with no heavy lifting.   Oral pain medicines have been prescribed for home use.  I discussed discharge along  With the limited activity and when to return for a follow-up visit.  I also discussed signs and symptoms to be worried about such as heavy bleeding, fever, drainage from the incision, weakness, and increasing pain, along with others.   There were no complications during her hospital stay.     Admit may 13  Dc may 17

## 2019-05-17 NOTE — PLAN OF CARE
Problem: Adult Inpatient Plan of Care  Goal: Plan of Care Review  Outcome: Ongoing (interventions implemented as appropriate)  POC reviewed with pt around 0745; verbalized acceptance and understanding.  Pt's VS stable.  Remains free from falls and injury.  Pain controlled with ordered meds.  Bonding well with baby.  Baby tolerating feedings; voiding/stooling appropriately.  Family at bedside to offer support.     circ care taught and demonstrated.  Verbalized acceptance/understanding.  Written and verbal instructions given.  Gauze and vaseline given for diaper changes.  Baby voided post circ with no difficulty.    Discharge instructions given verbally and in writing at 1250.  Verbalized understanding.  Received Mother-Baby care guide during hospital stay.  Prescriptions to be sent up from pharmacy and given with explanation for use.  Verbalized understanding.  States she feels comfortable taking care of baby and has demonstrated ability to care for  and herself.  Says she will have assistance when she returns home.  to be discharged to home in stable condition via wheelchair with infant in arms once receives prescriptions.  WCTM.

## 2019-05-17 NOTE — DISCHARGE INSTRUCTIONS
"Patient Discharge Instructions for Postpartum Women    Resume Regular Diet  Increase activity gradually, no heavy lifting  Shower  No tampons, douching or sexual intercourse.  Discuss birth control options with your physician.  Wear a support bra  Return to work/school when you've been cleared by a physician    Call your physician if     *Fever of 100.4 or higher  *Persistent nausea/ vomiting  *Incisional drainage  *Heavy vaginal bleeding or large clots (Heavy bleeding is soaking 1 pad in an hour)  *Swelling and pain in arms or legs  *Severe headaches, blurred vision or fainting  *Shortness of breath  *Frequency and burning with urination  *Signs of postpartum depression, discuss these signs with your physician    Call lactation services for questions regarding feeding, nipple and breast care, and general questions about lactation.  They can be reached at [phone].         Understanding Postpartum Depression    You've just had a baby.  You know you should be excited and happy.  But instead you find yourself crying for no reason.  You may have trouble coping with your daily tasks.  You feel sad, tired, and hopeless most of the time.  You may even feel ashamed or guilty.  But what you're going through is not your fault and you can feel better.  Talk to your doctor.  He or she can help.    Depression After Childbirth    You may be weepy and tired right after giving birth.  These feelings are normal.  They're sometimes called the "baby blues."  These blues go away 2-3 weeks.  However, postpartum (meaning "after birth") depression lasts much longer and is more sever than the "baby blues."  It can make you feel sad and hopeless.  You may also fear that your baby will be harmed and worry about being a bad mother.      What is Depression?    Depression is a mood disorder that affects the way you think and feel.  The most common symptom is a feeling of deep sadness.  You may also feel as if you just can't cope with life. "    Other symptoms include:      * Gaining or loosing weight  * Sleeping too much or too little  * Feeling tired all the time  * Feeling restless  * Fears of harming your baby   * Lack of interest in your baby  * Feeling worthless or guilty  * No longer finding pleasure in things you used to  * Having trouble thinking clearly or making decisions  * Thoughts of hurting yourself or your baby    What Causes Postpartum Depression    The exact causes of postpartum depression isn't known.  It may be due to changes in your hormones during and after childbirth.  You may also be tired from caring for your baby and adjusting to being a mother.  All these factors may make you feel depressed.  In some cases, your genes may also play a role.    Depression Can Be Treated    The good news is that there are many ways to treat postpartum depression.  Talking to your doctor is the first step toward feeling better.    Resources:    * National [place] of Mental Health  -- 979.614.5697    www.nimh.nih.gov    * National [place] on Mental Illness --723.404.7205    Www.liz.org    * Mental Health Wendy -- 287.590.6791     Www.[facility].org    * National Suicide Hotline --840.397.1291 (800-SUICIDE)    2517-2082 [employer]  All rights reserved.  This information is not intended as a substitute for professional medical care.  Always follow up with your healthcare professional's instructions.          Breastfeeding Discharge Instructions       Feed the baby at the earliest sign of hunger or comfort  o Hands to mouth, sucking motions  o Rooting or searching for something to suck on  o Dont wait for crying - it is a sign of distress     The feedings may be 8-12 times per 24hrs and will not follow a schedule   Avoid pacifiers and bottles for the first 4 weeks   Alternate the breast you start the feeding with, or start with the breast that feels the fullest   Switch breasts when the baby takes himself off the breast or falls  asleep   Keep offering breasts until the baby looks full, no longer gives hunger signs, and stays asleep when placed on his back in the crib   If the baby is sleepy and wont wake for a feeding, put the baby skin-to-skin dressed in a diaper against the mothers bare chest   Sleep near your baby   The baby should be positioned and latched on to the breast correctly  o Chest-to-chest, chin in the breast  o Babys lips are flipped outward  o Babys mouth is stretched open wide like a shout  o Babys sucking should feel like tugging to the mother  - The baby should be drinking at the breast:  o You should hear swallowing or gulping throughout the feeding  o You should see milk on the babys lips when he comes off the breast  o Your breasts should be softer when the baby is finished feeding  o The baby should look relaxed at the end of feedings  o After the 4th day and your milk is in:  o The babys poop should turn bright yellow and be loose, watery, and seedy  o The baby should have at least 3-4 poops the size of the palm of your hand per day  o The baby should have at least 5-6 wet diapers per day  o The urine should be light yellow in color  You should drink when you are thirsty and eat a healthy diet when you are    hungry.     Take naps to get the rest you need.   Take medications and/or drink alcohol only with permission of your obstetrician    or the babys pediatrician.  You can also call the [facility] Risk [facility],   ([phone]), Monday-Friday, 8am-5pm Central time, to get the most   up-to-date evidence-based information on the use of medications during   pregnancy and breastfeeding.      The baby should be examined by a pediatrician at 3-5 days of age.  Once your   milk comes in, the baby should be gaining at least ½ - 1oz each day and should be back to birthweight no later than 10-14 days of age.          Community Resources    [facility] Breastfeeding Warmline: [phone]  [facility]  clinics: provide incentives and breastpumps to eligible mothers  [employer] (LLLI):  mother-to-mother support group website        www.lll.org  [facility] La [employer]eche [employer] mother-to-mother support groups:        www.lljoiInnovative Silicon.LucidMedia        [facility] [employer] [facility]   Dr. Rafael Santos website for latch videos and general information:        www.breastfeedinginc.ca  [facility] is a call center that provides information about the safety of taking medications while breastfeeding.  Call 1-310.702.7716, M-F, 8am-5pm, [place].  International Lactation Consultant Association provides resources for assistance:        www.ilca.org  [facility] Breastfeeding Coalition provides informationand resources for parents  and the community    http://[facility].org     [employer] is a mom-to-mom support group:                             www.noalexisStellar.LucidMedia//breastfeedng-support/  [employer] for Healthy Babies:  0-484-778-BABY(8121)  [employer]: a breastfeeding support group for women of color, 412.916.4857

## 2019-05-17 NOTE — LACTATION NOTE
Mother provided with ice packs to take home to relieve engorgement.    [facility]  Lactation Note - Mom    SUMMARY     Maternal Assessment    Breast Size Issue: none  Breast Shape: Bilateral:, round  Breast Density: Bilateral:, full, other (see comments)(leaking)  Nipples: Bilateral:, everted, graspable  Left Nipple Symptoms: tender  Right Nipple Symptoms: tender    LATCH Score         Breasts WDL    Breast WDL: WDL except, breast symptoms, nipple symptoms  Left Breast Symptoms: full, tenderness generalized  Right Breast Symptoms: full, tenderness generalized  Left Nipple Symptoms: tender  Right Nipple Symptoms: tender    Maternal Infant Feeding    Maternal Preparation: breast care, hand hygiene  Maternal Emotional State: independent, relaxed  Signs of Milk Transfer: breasts soften with feeding(per mom )  Pain with Feeding: other (see comments)(initial latch )  Comfort Measures Following Feeding: air-drying encouraged, expressed milk applied, breast pads utilized  Latch Assistance: no(mother independent)    Lactation Referrals    Lactation Referrals: outpatient lactation program, pediatric care provider  Outpatient Lactation Program Lactation Follow-up Date/Time: pt purchased nursing bra from lactation center- call PRN with questions/concerns  Pediatric Care Provider Lactation Follow-up Date/Time: f/u early next week for weight check    Lactation Interventions    Breast Care: Breastfeeding: breast milk to nipples, breast pads utilized, manual expression to soften breast, milk massaged towards nipple, supportive bra utilized, warm shower encouraged  Breastfeeding Assistance: feeding cue recognition promoted, feeding on demand promoted, support offered  Breast Care: Breastfeeding: breast milk to nipples, breast pads utilized, manual expression to soften breast, milk massaged towards nipple, supportive bra utilized, warm shower encouraged  Breastfeeding Assistance: feeding cue recognition promoted,  feeding on demand promoted, support offered  Breastfeeding Support: encouragement provided, maternal rest encouraged, maternal nutrition promoted, maternal hydration promoted, lactation counseling provided       Breastfeeding Session    Breast Pumping Interventions: post-feed pumping encouraged, other (see comments)(few minutes to relieve engorgement as needed)  Signs of Milk Transfer: breasts soften with feeding(per mom )    Maternal Information

## 2019-05-17 NOTE — PLAN OF CARE
Problem: Adult Inpatient Plan of Care  Goal: Plan of Care Review  Outcome: Ongoing (interventions implemented as appropriate)  VSS. Voiding. +flatus. Minimal bleeding noted. Abd dressing remains c/d/i. Ambulates well. Tolerating diet. Pain managed with Tylenol and Ibuprofen. Refused other pain medications throughout shift. Pillow support and splinting utilized. Bonding well with baby. Will continue to monitor.

## 2019-05-20 ENCOUNTER — TELEPHONE (OUTPATIENT)
Dept: OBSTETRICS AND GYNECOLOGY | Facility: CLINIC | Age: 44
End: 2019-05-20

## 2019-05-20 ENCOUNTER — ANESTHESIA (OUTPATIENT)
Dept: OBSTETRICS AND GYNECOLOGY | Facility: HOSPITAL | Age: 44
End: 2019-05-20
Payer: COMMERCIAL

## 2019-05-20 ENCOUNTER — PATIENT MESSAGE (OUTPATIENT)
Dept: OBSTETRICS AND GYNECOLOGY | Facility: CLINIC | Age: 44
End: 2019-05-20

## 2019-05-20 ENCOUNTER — OFFICE VISIT (OUTPATIENT)
Dept: OBSTETRICS AND GYNECOLOGY | Facility: CLINIC | Age: 44
End: 2019-05-20
Payer: COMMERCIAL

## 2019-05-20 ENCOUNTER — LAB VISIT (OUTPATIENT)
Dept: LAB | Facility: HOSPITAL | Age: 44
End: 2019-05-20
Attending: OBSTETRICS & GYNECOLOGY
Payer: COMMERCIAL

## 2019-05-20 VITALS
SYSTOLIC BLOOD PRESSURE: 122 MMHG | DIASTOLIC BLOOD PRESSURE: 78 MMHG | BODY MASS INDEX: 30.49 KG/M2 | WEIGHT: 194.25 LBS | HEIGHT: 67 IN

## 2019-05-20 DIAGNOSIS — Z01.89 ROUTINE LAB DRAW: ICD-10-CM

## 2019-05-20 DIAGNOSIS — Z98.890 POST-OPERATIVE STATE: ICD-10-CM

## 2019-05-20 DIAGNOSIS — Z01.89 ROUTINE LAB DRAW: Primary | ICD-10-CM

## 2019-05-20 LAB
BASOPHILS # BLD AUTO: 0.01 K/UL (ref 0–0.2)
BASOPHILS NFR BLD: 0.2 % (ref 0–1.9)
DIFFERENTIAL METHOD: ABNORMAL
EOSINOPHIL # BLD AUTO: 0.1 K/UL (ref 0–0.5)
EOSINOPHIL NFR BLD: 2.1 % (ref 0–8)
ERYTHROCYTE [DISTWIDTH] IN BLOOD BY AUTOMATED COUNT: 16.1 % (ref 11.5–14.5)
HCT VFR BLD AUTO: 28.9 % (ref 37–48.5)
HGB BLD-MCNC: 8.7 G/DL (ref 12–16)
LYMPHOCYTES # BLD AUTO: 1.7 K/UL (ref 1–4.8)
LYMPHOCYTES NFR BLD: 28.3 % (ref 18–48)
MCH RBC QN AUTO: 24 PG (ref 27–31)
MCHC RBC AUTO-ENTMCNC: 30.1 G/DL (ref 32–36)
MCV RBC AUTO: 80 FL (ref 82–98)
MONOCYTES # BLD AUTO: 0.4 K/UL (ref 0.3–1)
MONOCYTES NFR BLD: 5.9 % (ref 4–15)
NEUTROPHILS # BLD AUTO: 3.9 K/UL (ref 1.8–7.7)
NEUTROPHILS NFR BLD: 63 % (ref 38–73)
PLATELET # BLD AUTO: 265 K/UL (ref 150–350)
PMV BLD AUTO: 10 FL (ref 9.2–12.9)
RBC # BLD AUTO: 3.63 M/UL (ref 4–5.4)
WBC # BLD AUTO: 6.11 K/UL (ref 3.9–12.7)

## 2019-05-20 PROCEDURE — 99499 NO LOS: ICD-10-PCS | Mod: S$GLB,,, | Performed by: OBSTETRICS & GYNECOLOGY

## 2019-05-20 PROCEDURE — 99999 PR PBB SHADOW E&M-EST. PATIENT-LVL II: ICD-10-PCS | Mod: PBBFAC,,, | Performed by: OBSTETRICS & GYNECOLOGY

## 2019-05-20 PROCEDURE — 85025 COMPLETE CBC W/AUTO DIFF WBC: CPT

## 2019-05-20 PROCEDURE — 99999 PR PBB SHADOW E&M-EST. PATIENT-LVL II: CPT | Mod: PBBFAC,,, | Performed by: OBSTETRICS & GYNECOLOGY

## 2019-05-20 PROCEDURE — 36415 COLL VENOUS BLD VENIPUNCTURE: CPT

## 2019-05-20 PROCEDURE — 99499 UNLISTED E&M SERVICE: CPT | Mod: S$GLB,,, | Performed by: OBSTETRICS & GYNECOLOGY

## 2019-05-21 ENCOUNTER — TELEPHONE (OUTPATIENT)
Dept: OBSTETRICS AND GYNECOLOGY | Facility: CLINIC | Age: 44
End: 2019-05-21

## 2019-05-21 NOTE — PROGRESS NOTES
Post rpt section tubal  Healing well  Stapes removed  disussed prog activityand care  No co  Feels good  Fu prn or for pap in august

## 2019-05-27 ENCOUNTER — TELEPHONE (OUTPATIENT)
Dept: OBSTETRICS AND GYNECOLOGY | Facility: CLINIC | Age: 44
End: 2019-05-27

## 2019-05-27 NOTE — TELEPHONE ENCOUNTER
Mom left message on warmline. Returned call. Stated that she has increased pain to L nipple. Pain score = 10 with initial latch then decreases to 7 after couple mins of sucking. Denies s/s of yeast. R nipple has min pain when BR. Mom is concerned that baby has shallow latch & doesn't open mouth wide. Discussed tips to improve position & achieve deeper asymmetrical latch. Discussed tips to help nipple soreness improve. Encouraged to keep nipples clean by rinsing with mild soap/water after BR. Discussed using lanolin or triple ointment with Dr Wiedemann's permission/recommendation. Discussed Negra's cream from [employer]. Mom stated that she will place call to OB clinic to discuss. Has been supplementing with EBM & formula about 3-4x/day. Has been pumping L side because too painful to latch at times. Baby has had 6+ voids/2+ yellow stools in the last 24 hrs. Discussed signs of adequate fdg. Lots of encouragement & reassurance provided. Lots of questions answered. Instructed to call for any further needs. Verbalized understanding.

## 2019-05-28 ENCOUNTER — TELEPHONE (OUTPATIENT)
Dept: OBSTETRICS AND GYNECOLOGY | Facility: CLINIC | Age: 44
End: 2019-05-28

## 2019-05-29 NOTE — TELEPHONE ENCOUNTER
F/u call done. Mom stated that tips we discussed yesterday are helping to improve nipple soreness. Has been holding baby in football position & feels like he maintains a deeper latch that way. Has been using lanolin. Pain score today has decreased from yesterday per mom. Nipple pain score with last feeding = 6 with initial latch then decreases to 2-3 after few mins of sucking. Lots of praise & encouragement provided. Questions answered. Instructed to call for any further needs. Verbalized understanding.

## 2019-07-24 ENCOUNTER — PATIENT MESSAGE (OUTPATIENT)
Dept: OBSTETRICS AND GYNECOLOGY | Facility: CLINIC | Age: 44
End: 2019-07-24

## 2019-08-08 ENCOUNTER — OFFICE VISIT (OUTPATIENT)
Dept: INTERNAL MEDICINE | Facility: CLINIC | Age: 44
End: 2019-08-08
Payer: COMMERCIAL

## 2019-08-08 VITALS
SYSTOLIC BLOOD PRESSURE: 100 MMHG | HEART RATE: 72 BPM | BODY MASS INDEX: 29.97 KG/M2 | DIASTOLIC BLOOD PRESSURE: 62 MMHG | RESPIRATION RATE: 16 BRPM | HEIGHT: 67 IN | WEIGHT: 190.94 LBS | TEMPERATURE: 99 F

## 2019-08-08 DIAGNOSIS — Z00.00 ANNUAL PHYSICAL EXAM: Primary | ICD-10-CM

## 2019-08-08 DIAGNOSIS — Z12.39 BREAST CANCER SCREENING: ICD-10-CM

## 2019-08-08 DIAGNOSIS — D17.9 LIPOMA, UNSPECIFIED SITE: ICD-10-CM

## 2019-08-08 PROBLEM — H40.9 GLAUCOMA: Status: ACTIVE | Noted: 2019-08-08

## 2019-08-08 PROCEDURE — 99396 PR PREVENTIVE VISIT,EST,40-64: ICD-10-PCS | Mod: S$GLB,,, | Performed by: HOSPITALIST

## 2019-08-08 PROCEDURE — 99999 PR PBB SHADOW E&M-EST. PATIENT-LVL III: ICD-10-PCS | Mod: PBBFAC,,, | Performed by: HOSPITALIST

## 2019-08-08 PROCEDURE — 99999 PR PBB SHADOW E&M-EST. PATIENT-LVL III: CPT | Mod: PBBFAC,,, | Performed by: HOSPITALIST

## 2019-08-08 PROCEDURE — 99396 PREV VISIT EST AGE 40-64: CPT | Mod: S$GLB,,, | Performed by: HOSPITALIST

## 2019-08-08 NOTE — PROGRESS NOTES
"Subjective:     @Patient ID: Gladis Sesay is a 44 y.o. female.    Chief Complaint: Annual Exam and Establish Care    HPI    44 y.o. female here for annual exam. Pt is new to me. She is an RN at [employer]. She recently had a baby a few months ago and has returned to work. She reports she is doing well. Has noticed a lump around her neck with certain neck movements it is more noticeable.     Lipid disorders/ASCVD risk (ages >/= 45 or >/= 20 if increased risk ): ordered  DM (>45y yearly or if obese, HTN): A1c ordered  Eye exam: last 2017.   MMG: Due  Cervical Cancer (Pap Smear ages 21-65 every 3 years or Pap + HPV q5 years after 30 years of age):  Done 2018       Vaccines:   Influenza (yearly)   Tetanus (every 10 yrs - 1st tdap) Tdap 2015       Exercise: walking   Diet: regular            Review of Systems   Constitutional: Negative for chills and fever.   HENT: Negative for congestion and sore throat.    Eyes: Negative for pain and visual disturbance.   Respiratory: Negative for cough and shortness of breath.    Cardiovascular: Negative for chest pain and leg swelling.   Gastrointestinal: Negative for abdominal pain, nausea and vomiting.   Endocrine: Negative for polydipsia and polyuria.   Genitourinary: Negative for difficulty urinating and dysuria.   Musculoskeletal: Negative for arthralgias and back pain.   Skin: Negative for rash and wound.   Neurological: Negative for dizziness, weakness and headaches.   Psychiatric/Behavioral: Negative for agitation and confusion.     Past medical history, surgical history, and family medical history reviewed and updated as appropriate.    Medications and allergies reviewed.     Objective:     Vitals:    08/08/19 1318   BP: 100/62   BP Location: Right arm   Patient Position: Sitting   BP Method: Medium (Manual)   Pulse: 72   Resp: 16   Temp: 98.9 °F (37.2 °C)   TempSrc: Oral   Weight: 86.6 kg (190 lb 14.7 oz)   Height: 5' 7" (1.702 m)     Body mass index is 29.9 " kg/m².  Physical Exam   Constitutional: She is oriented to person, place, and time. She appears well-developed and well-nourished. No distress.   HENT:   Head: Normocephalic and atraumatic.   Mouth/Throat: Oropharynx is clear and moist. No oropharyngeal exudate.   Eyes: Conjunctivae are normal. Right eye exhibits no discharge. Left eye exhibits no discharge.   Neck: Normal range of motion. Neck supple.   Cardiovascular: Normal rate, regular rhythm and intact distal pulses. Exam reveals no friction rub.   No murmur heard.  Pulmonary/Chest: Effort normal and breath sounds normal.   Abdominal: Soft. Bowel sounds are normal. She exhibits no distension. There is no tenderness. There is no guarding.   Musculoskeletal: Normal range of motion. She exhibits no edema.   Lymphadenopathy:     She has no cervical adenopathy.   Neurological: She is alert and oriented to person, place, and time.   Skin: Skin is warm and dry.   Likely lipoma of R anterior neck area. Soft mobile mass. Non-tender. Pt to continue to monitor for any changes   Psychiatric: She has a normal mood and affect. Her behavior is normal.   Vitals reviewed.      Lab Results   Component Value Date    WBC 6.11 05/20/2019    HGB 8.7 (L) 05/20/2019    HCT 28.9 (L) 05/20/2019     05/20/2019    ALT 9 (L) 05/14/2019    AST 20 05/14/2019     05/14/2019    K 4.4 05/14/2019     05/14/2019    CREATININE 0.5 05/14/2019    BUN 6 05/14/2019    CO2 26 05/14/2019    TSH 1.918 06/23/2017    INR 1.1 06/27/2016       Assessment:     1. Annual physical exam    2. Breast cancer screening    3. Lipoma, unspecified site      Plan:   Gladis was seen today for annual exam and establish care.    Diagnoses and all orders for this visit:    Annual physical exam  -     Comprehensive metabolic panel; Future  -     CBC auto differential; Future  -     TSH; Future  -     Vitamin D; Future  -     Lipid panel; Future  -     Urinalysis; Future  -     HEMOGLOBIN A1C;  Future    Breast cancer screening  -     Mammo Digital Screening Bilat w/ Torey; Future    Lipoma, unspecified site        - Appears to be lipoma. Pt will continue to monitor for any changes.         Follow up in about 1 year (around 8/8/2020), or if symptoms worsen or fail to improve.    Scarlett Esparza MD  Internal Medicine    8/8/2019

## 2019-08-13 ENCOUNTER — LAB VISIT (OUTPATIENT)
Dept: LAB | Facility: HOSPITAL | Age: 44
End: 2019-08-13
Attending: HOSPITALIST
Payer: COMMERCIAL

## 2019-08-13 DIAGNOSIS — Z00.00 ANNUAL PHYSICAL EXAM: ICD-10-CM

## 2019-08-13 LAB
25(OH)D3+25(OH)D2 SERPL-MCNC: 10 NG/ML (ref 30–96)
ALBUMIN SERPL BCP-MCNC: 4 G/DL (ref 3.5–5.2)
ALP SERPL-CCNC: 134 U/L (ref 55–135)
ALT SERPL W/O P-5'-P-CCNC: 20 U/L (ref 10–44)
ANION GAP SERPL CALC-SCNC: 6 MMOL/L (ref 8–16)
AST SERPL-CCNC: 22 U/L (ref 10–40)
BASOPHILS # BLD AUTO: 0.01 K/UL (ref 0–0.2)
BASOPHILS NFR BLD: 0.2 % (ref 0–1.9)
BILIRUB SERPL-MCNC: 0.2 MG/DL (ref 0.1–1)
BUN SERPL-MCNC: 10 MG/DL (ref 6–20)
CALCIUM SERPL-MCNC: 9.3 MG/DL (ref 8.7–10.5)
CHLORIDE SERPL-SCNC: 105 MMOL/L (ref 95–110)
CHOLEST SERPL-MCNC: 231 MG/DL (ref 120–199)
CHOLEST/HDLC SERPL: 4.4 {RATIO} (ref 2–5)
CO2 SERPL-SCNC: 27 MMOL/L (ref 23–29)
CREAT SERPL-MCNC: 0.7 MG/DL (ref 0.5–1.4)
DIFFERENTIAL METHOD: ABNORMAL
EOSINOPHIL # BLD AUTO: 0.2 K/UL (ref 0–0.5)
EOSINOPHIL NFR BLD: 4 % (ref 0–8)
ERYTHROCYTE [DISTWIDTH] IN BLOOD BY AUTOMATED COUNT: 17.4 % (ref 11.5–14.5)
EST. GFR  (AFRICAN AMERICAN): >60 ML/MIN/1.73 M^2
EST. GFR  (NON AFRICAN AMERICAN): >60 ML/MIN/1.73 M^2
ESTIMATED AVG GLUCOSE: 111 MG/DL (ref 68–131)
GLUCOSE SERPL-MCNC: 98 MG/DL (ref 70–110)
HBA1C MFR BLD HPLC: 5.5 % (ref 4–5.6)
HCT VFR BLD AUTO: 37.5 % (ref 37–48.5)
HDLC SERPL-MCNC: 53 MG/DL (ref 40–75)
HDLC SERPL: 22.9 % (ref 20–50)
HGB BLD-MCNC: 11.6 G/DL (ref 12–16)
LDLC SERPL CALC-MCNC: 155.4 MG/DL (ref 63–159)
LYMPHOCYTES # BLD AUTO: 2.4 K/UL (ref 1–4.8)
LYMPHOCYTES NFR BLD: 56.7 % (ref 18–48)
MCH RBC QN AUTO: 25.7 PG (ref 27–31)
MCHC RBC AUTO-ENTMCNC: 30.9 G/DL (ref 32–36)
MCV RBC AUTO: 83 FL (ref 82–98)
MONOCYTES # BLD AUTO: 0.3 K/UL (ref 0.3–1)
MONOCYTES NFR BLD: 7.1 % (ref 4–15)
NEUTROPHILS # BLD AUTO: 1.4 K/UL (ref 1.8–7.7)
NEUTROPHILS NFR BLD: 32 % (ref 38–73)
NONHDLC SERPL-MCNC: 178 MG/DL
PLATELET # BLD AUTO: 213 K/UL (ref 150–350)
PLATELET BLD QL SMEAR: ABNORMAL
PMV BLD AUTO: 11.5 FL (ref 9.2–12.9)
POTASSIUM SERPL-SCNC: 4.2 MMOL/L (ref 3.5–5.1)
PROT SERPL-MCNC: 7.8 G/DL (ref 6–8.4)
RBC # BLD AUTO: 4.52 M/UL (ref 4–5.4)
SODIUM SERPL-SCNC: 138 MMOL/L (ref 136–145)
TRIGL SERPL-MCNC: 113 MG/DL (ref 30–150)
TSH SERPL DL<=0.005 MIU/L-ACNC: 1.06 UIU/ML (ref 0.4–4)
WBC # BLD AUTO: 4.23 K/UL (ref 3.9–12.7)

## 2019-08-13 PROCEDURE — 80053 COMPREHEN METABOLIC PANEL: CPT

## 2019-08-13 PROCEDURE — 80061 LIPID PANEL: CPT

## 2019-08-13 PROCEDURE — 36415 COLL VENOUS BLD VENIPUNCTURE: CPT

## 2019-08-13 PROCEDURE — 83036 HEMOGLOBIN GLYCOSYLATED A1C: CPT

## 2019-08-13 PROCEDURE — 82306 VITAMIN D 25 HYDROXY: CPT

## 2019-08-13 PROCEDURE — 85025 COMPLETE CBC W/AUTO DIFF WBC: CPT

## 2019-08-13 PROCEDURE — 84443 ASSAY THYROID STIM HORMONE: CPT

## 2019-08-14 ENCOUNTER — PATIENT MESSAGE (OUTPATIENT)
Dept: INTERNAL MEDICINE | Facility: CLINIC | Age: 44
End: 2019-08-14

## 2019-08-19 ENCOUNTER — PATIENT MESSAGE (OUTPATIENT)
Dept: INTERNAL MEDICINE | Facility: CLINIC | Age: 44
End: 2019-08-19

## 2019-08-19 DIAGNOSIS — E78.5 HYPERLIPIDEMIA, UNSPECIFIED HYPERLIPIDEMIA TYPE: Primary | ICD-10-CM

## 2019-08-20 ENCOUNTER — PATIENT MESSAGE (OUTPATIENT)
Dept: INTERNAL MEDICINE | Facility: CLINIC | Age: 44
End: 2019-08-20

## 2019-09-20 ENCOUNTER — PATIENT MESSAGE (OUTPATIENT)
Dept: INTERNAL MEDICINE | Facility: CLINIC | Age: 44
End: 2019-09-20

## 2019-09-20 RX ORDER — CEPHALEXIN 500 MG/1
500 CAPSULE ORAL EVERY 12 HOURS
Qty: 14 CAPSULE | Refills: 0 | Status: SHIPPED | OUTPATIENT
Start: 2019-09-20 | End: 2021-01-16 | Stop reason: ALTCHOICE

## 2019-09-20 NOTE — TELEPHONE ENCOUNTER
Spoke w pt about urinary symptoms. Pt with burning, frequency for few days . Keflex safe w/ lactation

## 2019-11-06 ENCOUNTER — PATIENT OUTREACH (OUTPATIENT)
Dept: ADMINISTRATIVE | Facility: OTHER | Age: 44
End: 2019-11-06

## 2020-02-01 ENCOUNTER — PATIENT MESSAGE (OUTPATIENT)
Dept: INTERNAL MEDICINE | Facility: CLINIC | Age: 45
End: 2020-02-01

## 2020-02-01 DIAGNOSIS — E78.5 HYPERLIPIDEMIA, UNSPECIFIED HYPERLIPIDEMIA TYPE: ICD-10-CM

## 2020-02-03 ENCOUNTER — CLINICAL SUPPORT (OUTPATIENT)
Dept: AUDIOLOGY | Facility: CLINIC | Age: 45
End: 2020-02-03
Payer: COMMERCIAL

## 2020-02-03 ENCOUNTER — TELEPHONE (OUTPATIENT)
Dept: OTOLARYNGOLOGY | Facility: CLINIC | Age: 45
End: 2020-02-03

## 2020-02-03 ENCOUNTER — OFFICE VISIT (OUTPATIENT)
Dept: OTOLARYNGOLOGY | Facility: CLINIC | Age: 45
End: 2020-02-03
Payer: COMMERCIAL

## 2020-02-03 VITALS
BODY MASS INDEX: 29.69 KG/M2 | DIASTOLIC BLOOD PRESSURE: 71 MMHG | SYSTOLIC BLOOD PRESSURE: 125 MMHG | HEART RATE: 81 BPM | WEIGHT: 189.63 LBS

## 2020-02-03 DIAGNOSIS — H91.22 SUDDEN LEFT HEARING LOSS: Primary | ICD-10-CM

## 2020-02-03 DIAGNOSIS — H93.232 HYPERACUSIS OF LEFT EAR: ICD-10-CM

## 2020-02-03 DIAGNOSIS — H93.8X2 EAR FULLNESS, LEFT: ICD-10-CM

## 2020-02-03 DIAGNOSIS — H90.5 SENSORINEURAL HEARING LOSS OF LOW FREQUENCY: Primary | ICD-10-CM

## 2020-02-03 PROBLEM — E78.5 HLD (HYPERLIPIDEMIA): Status: ACTIVE | Noted: 2020-02-03

## 2020-02-03 PROCEDURE — 99999 PR PBB SHADOW E&M-EST. PATIENT-LVL I: ICD-10-PCS | Mod: PBBFAC,,,

## 2020-02-03 PROCEDURE — 99203 PR OFFICE/OUTPT VISIT, NEW, LEVL III, 30-44 MIN: ICD-10-PCS | Mod: S$GLB,,, | Performed by: NURSE PRACTITIONER

## 2020-02-03 PROCEDURE — 92567 TYMPANOMETRY: CPT | Mod: S$GLB,,, | Performed by: AUDIOLOGIST

## 2020-02-03 PROCEDURE — 92557 COMPREHENSIVE HEARING TEST: CPT | Mod: S$GLB,,, | Performed by: AUDIOLOGIST

## 2020-02-03 PROCEDURE — 99999 PR PBB SHADOW E&M-EST. PATIENT-LVL I: CPT | Mod: PBBFAC,,,

## 2020-02-03 PROCEDURE — 3008F PR BODY MASS INDEX (BMI) DOCUMENTED: ICD-10-PCS | Mod: CPTII,S$GLB,, | Performed by: NURSE PRACTITIONER

## 2020-02-03 PROCEDURE — 99999 PR PBB SHADOW E&M-EST. PATIENT-LVL III: CPT | Mod: PBBFAC,,, | Performed by: NURSE PRACTITIONER

## 2020-02-03 PROCEDURE — 92567 PR TYMPA2METRY: ICD-10-PCS | Mod: S$GLB,,, | Performed by: AUDIOLOGIST

## 2020-02-03 PROCEDURE — 99999 PR PBB SHADOW E&M-EST. PATIENT-LVL III: ICD-10-PCS | Mod: PBBFAC,,, | Performed by: NURSE PRACTITIONER

## 2020-02-03 PROCEDURE — 99203 OFFICE O/P NEW LOW 30 MIN: CPT | Mod: S$GLB,,, | Performed by: NURSE PRACTITIONER

## 2020-02-03 PROCEDURE — 3008F BODY MASS INDEX DOCD: CPT | Mod: CPTII,S$GLB,, | Performed by: NURSE PRACTITIONER

## 2020-02-03 PROCEDURE — 92557 PR COMPREHENSIVE HEARING TEST: ICD-10-PCS | Mod: S$GLB,,, | Performed by: AUDIOLOGIST

## 2020-02-03 RX ORDER — PREDNISONE 5 MG/1
TABLET ORAL
Qty: 90 TABLET | Refills: 0 | Status: SHIPPED | OUTPATIENT
Start: 2020-02-03

## 2020-02-03 RX ORDER — DEXAMETHASONE 4 MG/1
4 TABLET ORAL EVERY 12 HOURS
COMMUNITY
End: 2020-02-03 | Stop reason: DRUGHIGH

## 2020-02-03 RX ORDER — ATORVASTATIN CALCIUM 10 MG/1
10 TABLET, FILM COATED ORAL DAILY
Qty: 90 TABLET | Refills: 3 | Status: SHIPPED | OUTPATIENT
Start: 2020-02-03 | End: 2021-02-02

## 2020-02-03 NOTE — PROGRESS NOTES
Gladis Sesay was referred by Kieran Schafer DNP, for an audiological evaluation today.  Ms. Sesay reported hyperacusis of her left ear.    Audiological testing revealed normal hearing sensitivity for the right ear and a moderate rising to mild low frequency sensorineural hearing loss for the left ear.  A speech reception threshold was obtained at 15 dBHL for the right ear and at 20 dBHL for the left ear.  Speech discrimination was 100% for the right ear and 96% for the left ear.      Tympanometry testing revealed a Type A tympanogram for the right ear and a Type A tympanogram for the left ear.      Recommendations:  1. Otologic evaluation  2. Follow-up audiological evaluation of the left ear  3. Hearing protection when in noise

## 2020-02-03 NOTE — TELEPHONE ENCOUNTER
Viv spoke patient and schedule her a appointment with  for Tuesday 02/04/2019 at 9:00AM. Patient voice understanding.

## 2020-02-03 NOTE — TELEPHONE ENCOUNTER
----- Message from Keyanna Hernandez sent at 2/3/2020  1:10 PM CST -----  Contact: self  Patient is requesting a call back concerning scheduling a same day appt for her sinus and feeling congested. Patient has a schedule appt on Wednesday 2/5. Please call

## 2020-02-03 NOTE — TELEPHONE ENCOUNTER
----- Message from Sierra PARISHRubens Figueredo sent at 2/3/2020 12:40 PM CST -----  Contact: [phone] self   Pt is requesting to be seen sooner than the next available appt for sinus. Pt stated that she thinks she has fluid behind/in ear.  Please advise  .

## 2020-02-03 NOTE — PROGRESS NOTES
Subjective:      Gladis Sesay is a 44 y.o. female who was self-referred for aural fullness.    Ms. Sesay presents today with left ear fullness for the past 2 days. She reports associated tinnitus in left ear with muffled hearing. She reports sensitivity to sound. She denies ear pain or drainage. She reports a recent history of upper respiratory infection that occur 2 weeks ago that is now resolved.   There is not a family history of hearing loss at a young age.  There is not a prior history of ear surgery.  There is not a prior history of ear infections .  She denies a history of significant noise exposure.  She does not wear hearing aids currently.  She has not had a hearing test recently. She reports a history of open-angle glaucoma. She states she was last seen by Dr. Hannah Frias in [place] for her glaucoma.       Past Medical History  She has a past medical history of Gestational diabetes and Glaucoma.    Past Surgical History  She has a past surgical history that includes  section; Dilation and curettage of uterus; and  section with tubal ligation (N/A, 2019).    Family History  Her family history includes Diabetes in her paternal aunt and paternal uncle.    Social History  She reports that she has never smoked. She has never used smokeless tobacco. She reports that she drinks alcohol. She reports that she does not use drugs.    Allergies  She has No Known Allergies.    Medications  She has a current medication list which includes the following prescription(s): atorvastatin, cephalexin, iron,carb/vit c/vit b12/folic, and prednisone.    Review of Systems   Constitutional: Negative for chills, fever and unexpected weight change.   HENT: Positive for hearing loss and tinnitus. Negative for congestion, ear discharge, ear pain, facial swelling, postnasal drip, sinus pressure, sore throat and trouble swallowing.         Left aural fullness   Eyes: Negative for pain and visual  disturbance.   Respiratory: Negative for apnea and shortness of breath.    Cardiovascular: Negative for chest pain and palpitations.   Gastrointestinal: Negative for abdominal pain and nausea.   Endocrine: Negative for cold intolerance and heat intolerance.   Musculoskeletal: Negative for joint swelling and neck stiffness.   Skin: Negative for color change and rash.   Neurological: Negative for dizziness, facial asymmetry and headaches.   Hematological: Negative for adenopathy. Does not bruise/bleed easily.   Psychiatric/Behavioral: Negative for agitation. The patient is not nervous/anxious.           Objective:     /71   Pulse 81   Wt 86 kg (189 lb 9.5 oz)   BMI 29.69 kg/m²      Constitutional:   Vital signs are normal. She appears well-developed and well-nourished.     Head:  Normocephalic and atraumatic.     Ears:    Right Ear: No lacerations. No drainage, swelling or tenderness. No foreign bodies. No mastoid tenderness. Tympanic membrane is not injected, not scarred, not perforated, not erythematous, not retracted and not bulging. Tympanic membrane mobility is normal. No middle ear effusion. No hemotympanum. No decreased hearing is noted.   Left Ear: No lacerations. No drainage, swelling or tenderness. No foreign bodies. No mastoid tenderness. Tympanic membrane is not injected, not scarred, not perforated, not erythematous, not retracted and not bulging. Tympanic membrane mobility is normal.  No middle ear effusion. No hemotympanum. Decreased hearing is noted.     Nose:  Nose normal including turbinates, nasal mucosa, sinuses and nasal septum.     Neck:  Neck normal without thyromegaly masses, asymmetry, normal tracheal structure, crepitus, and tenderness and no adenopathy.     Psychiatric:   She has a normal mood and affect.       Procedure    None      Data Reviewed    WBC (K/uL)   Date Value   08/13/2019 4.23     Platelets (K/uL)   Date Value   08/13/2019 213      Creatinine (mg/dL)   Date Value    08/13/2019 0.7     TSH (uIU/mL)   Date Value   08/13/2019 1.062     Glucose (mg/dL)   Date Value   08/13/2019 98     Hemoglobin A1C (%)   Date Value   08/13/2019 5.5       I independently reviewed the tracings of the complete audiometric evaluation performed today.  I reviewed the audiogram with the patient as well.  Pertinent findings include Left moderate - mild low frequency SNHL (250-750Hz). Normal audiogram on right. Type A tympanogram in both ears. Right SRT 15 with 100% discrimination at 55db. Left SRT 20 with 96% discrimination at 60db..         Assessment:     1. Sudden left hearing loss    2. Ear fullness, left    3. Hyperacusis of left ear         Plan:     I had a long discussion with the patient regarding her condition and the further workup and management options.  Gladis was seen today for otalgia.    Diagnoses and all orders for this visit:    Sudden left hearing loss  -     predniSONE (DELTASONE) 5 MG tablet; Take 6 tablets by mouth twice daily for 5 days, then 5 tablets twice daily for 1 day, then 4 twice daily for 1 day, then 3 twice daily for 1 day, then 2 twice daily for 1 day, then one tablet twice daily for a day, then stop.    Ear fullness, left  -     Ambulatory referral to Audiology    Hyperacusis of left ear    - I have conducted a peer to peer with Dr. Hannah Frias (Ophthalmologist) who states she has not seen Ms. Sesay in about 5 years. I have express my concern about starting Ms. Sesay on high dose steroid treatment for her sudden hearing loss. She states there is no absolute contraindication to treating patient with high dose steroid therapy, but she does recommend patient be evaluated by an ophthalmologist soon to monitor eye pressures. Patient states she has an appointment scheduled for March 3, 2020 to see ophthalmology.  - I ordered high dose prednisone taper.   - Follow up in one week with repeat audiogram.

## 2020-02-05 ENCOUNTER — NURSE TRIAGE (OUTPATIENT)
Dept: ADMINISTRATIVE | Facility: CLINIC | Age: 45
End: 2020-02-05

## 2020-02-05 NOTE — TELEPHONE ENCOUNTER
Patient called in and wants to leave a message for Dr. Schafer. In reference to a test for glaucoma. Will route message to his office.     Reason for Disposition   Health Information question, no triage required and triager able to answer question    Additional Information   Negative: [1] Caller is not with the adult (patient) AND [2] reporting urgent symptoms   Negative: Lab result questions   Negative: Medication questions   Negative: Caller can't be reached by phone   Negative: Caller has already spoken to PCP or another triager   Negative: RN needs further essential information from caller in order to complete triage   Negative: Requesting regular office appointment   Negative: [1] Caller requesting NON-URGENT health information AND [2] PCP's office is the best resource    Protocols used: INFORMATION ONLY CALL-A-

## 2020-02-10 ENCOUNTER — CLINICAL SUPPORT (OUTPATIENT)
Dept: AUDIOLOGY | Facility: CLINIC | Age: 45
End: 2020-02-10
Payer: COMMERCIAL

## 2020-02-10 ENCOUNTER — OFFICE VISIT (OUTPATIENT)
Dept: OTOLARYNGOLOGY | Facility: CLINIC | Age: 45
End: 2020-02-10
Payer: COMMERCIAL

## 2020-02-10 VITALS
HEART RATE: 80 BPM | DIASTOLIC BLOOD PRESSURE: 72 MMHG | BODY MASS INDEX: 29.69 KG/M2 | WEIGHT: 189.63 LBS | SYSTOLIC BLOOD PRESSURE: 123 MMHG

## 2020-02-10 DIAGNOSIS — H91.22 SUDDEN LEFT HEARING LOSS: ICD-10-CM

## 2020-02-10 DIAGNOSIS — R09.81 NASAL CONGESTION: ICD-10-CM

## 2020-02-10 DIAGNOSIS — G47.9 SLEEP DISTURBANCE: Primary | ICD-10-CM

## 2020-02-10 DIAGNOSIS — R06.83 SNORING: ICD-10-CM

## 2020-02-10 DIAGNOSIS — Z01.10 NORMAL HEARING EXAM: Primary | ICD-10-CM

## 2020-02-10 PROCEDURE — 31575 LARYNGOSCOPY: ICD-10-PCS | Mod: S$GLB,,, | Performed by: NURSE PRACTITIONER

## 2020-02-10 PROCEDURE — 3008F BODY MASS INDEX DOCD: CPT | Mod: CPTII,S$GLB,, | Performed by: NURSE PRACTITIONER

## 2020-02-10 PROCEDURE — 92567 TYMPANOMETRY: CPT | Mod: S$GLB,,, | Performed by: AUDIOLOGIST

## 2020-02-10 PROCEDURE — 92567 PR TYMPA2METRY: ICD-10-PCS | Mod: S$GLB,,, | Performed by: AUDIOLOGIST

## 2020-02-10 PROCEDURE — 99999 PR PBB SHADOW E&M-EST. PATIENT-LVL III: ICD-10-PCS | Mod: PBBFAC,,, | Performed by: NURSE PRACTITIONER

## 2020-02-10 PROCEDURE — 3008F PR BODY MASS INDEX (BMI) DOCUMENTED: ICD-10-PCS | Mod: CPTII,S$GLB,, | Performed by: NURSE PRACTITIONER

## 2020-02-10 PROCEDURE — 99213 PR OFFICE/OUTPT VISIT, EST, LEVL III, 20-29 MIN: ICD-10-PCS | Mod: 25,S$GLB,, | Performed by: NURSE PRACTITIONER

## 2020-02-10 PROCEDURE — 99213 OFFICE O/P EST LOW 20 MIN: CPT | Mod: 25,S$GLB,, | Performed by: NURSE PRACTITIONER

## 2020-02-10 PROCEDURE — 99999 PR PBB SHADOW E&M-EST. PATIENT-LVL III: CPT | Mod: PBBFAC,,, | Performed by: NURSE PRACTITIONER

## 2020-02-10 PROCEDURE — 31575 DIAGNOSTIC LARYNGOSCOPY: CPT | Mod: S$GLB,,, | Performed by: NURSE PRACTITIONER

## 2020-02-10 PROCEDURE — 92557 PR COMPREHENSIVE HEARING TEST: ICD-10-PCS | Mod: S$GLB,,, | Performed by: AUDIOLOGIST

## 2020-02-10 PROCEDURE — 92557 COMPREHENSIVE HEARING TEST: CPT | Mod: S$GLB,,, | Performed by: AUDIOLOGIST

## 2020-02-10 NOTE — PROCEDURES
"Laryngoscopy  Date/Time: 2/10/2020 2:30 PM  Performed by: Kieran Schafer NP  Authorized by: Kieran Schafer NP     Time out: Immediately prior to procedure a "time out" was called to verify the correct patient, procedure, equipment, support staff and site/side marked as required.    Consent Done?:  Yes (Verbal)  Anesthesia:     Local anesthetic:  Lidocaine 4% and Shiva-Synephrine 1/2%    Patient tolerance:  Patient tolerated the procedure well with no immediate complications  Laryngoscopy:     Areas examined:  Nasal cavities, nasopharynx, oropharynx, hypopharynx, larynx and vocal cords    Laryngoscope size:  4 mm  Nose Intranasal:      Mucosa no polyps     Mucosa ulcers not present     No mucosa lesions present     Septum gross deformity     Turbinates not enlarged  Nasopharynx:      No mucosa lesions     Adenoids not present     Posterior choanae patent     Eustachian tube patent  Larynx/hypopharynx:      No epiglottis lesions     No epiglottis edema     No AE folds lesions     No vocal cord polyps     Not equal and normal bilateral     No hypopharynx lesions     No piriform sinus pooling     No piriform sinus lesions     No post cricoid edema     No post cricoid erythema     Generalized Mucosal edema in both nasal cavities  Slight left septal deviation  No polyps or purulence.  No evidence of reflux changes  Examined with scope # [id number].      "

## 2020-02-10 NOTE — PROGRESS NOTES
Subjective:      Gladis is a 44 y.o. female who comes for follow-up of left sudden hearing loss.  Her last visit with me was on 2/3/2020.  Patient states her hearing has significantly improved with oral steroids. She states she no longer has the left ear fullness. She states today that she has been snoring for the past several years. She reports associated nasal congestion and runny nose that comes and goes. She reports day times tiredness and fatigue most days. She denies itchy nose, sneezing, sinus pressure, hyposmia, cough or sore throat. She denies a history of allergy testing. She denies a history of asthma or GERD. She denies a history of sinus surgery. She denies use of any nasal sprays or antihistamines.       The patient's medications, allergies, past medical, surgical, social and family histories were reviewed and updated as appropriate.    A detailed review of systems was obtained with pertinent positives as per the above HPI, and otherwise negative.        Objective:     /72   Pulse 80   Wt 86 kg (189 lb 9.5 oz)   BMI 29.69 kg/m²        Constitutional:   She is oriented to person, place, and time. Vital signs are normal. She appears well-developed and well-nourished. She appears alert. Normal speech.      Head:  Normocephalic and atraumatic.     Ears:    Right Ear: No lacerations. No drainage, swelling or tenderness. No foreign bodies. No mastoid tenderness. Tympanic membrane is not injected, not scarred, not perforated, not erythematous, not retracted and not bulging. Tympanic membrane mobility is normal. No middle ear effusion. No hemotympanum. No decreased hearing is noted.   Left Ear: No lacerations. No drainage, swelling or tenderness. No foreign bodies. No mastoid tenderness. Tympanic membrane is not injected, not scarred, not perforated, not erythematous, not retracted and not bulging. Tympanic membrane mobility is normal.  No middle ear effusion. No hemotympanum. No decreased hearing is  noted.     Nose:  Mucosal edema present. No rhinorrhea, nose lacerations, sinus tenderness, septal deviation, nasal septal hematoma or polyps. No epistaxis.  No foreign bodies. No turbinate hypertrophy.  Right sinus exhibits no maxillary sinus tenderness and no frontal sinus tenderness. Left sinus exhibits no maxillary sinus tenderness and no frontal sinus tenderness.     Mouth/Throat  Oropharynx clear and moist without lesions or asymmetry, normal uvula midline and lips, teeth, and gums normal. No uvula swelling, oral lesions, trismus, mucous membrane lesions or xerostomia. No oropharyngeal exudate, posterior oropharyngeal edema or posterior oropharyngeal erythema.     Neck:  Neck normal without thyromegaly masses, asymmetry, normal tracheal structure, crepitus, and tenderness and no adenopathy.     Psychiatric:   She has a normal mood and affect. Her speech is normal and behavior is normal.     Neurological:   She is alert and oriented to person, place, and time. No cranial nerve deficit.     Skin:   No abrasions, lacerations, lesions, or rashes.       Procedure    Flexible laryngoscopy performed.  See procedure note.      Data Reviewed    WBC (K/uL)   Date Value   08/13/2019 4.23     Eosinophil% (%)   Date Value   08/13/2019 4.0     Eos # (K/uL)   Date Value   08/13/2019 0.2     Platelets (K/uL)   Date Value   08/13/2019 213     Glucose (mg/dL)   Date Value   08/13/2019 98     No results found for: IGE          I independently reviewed the tracings of the complete audiometric evaluation performed today.  I reviewed the audiogram with the patient as well.  Pertinent findings include normal audiometric testing. Improved from prior audiogram.        Assessment:     1. Sleep disturbance    2. Snoring    3. Sudden left hearing loss    4. Nasal congestion         Plan:     Hearing has greatly improved from prior audiogram a week ago.  Patient's hearing has improved to normal with steroid taper.  Complete full taper  steroid course.  I placed referral to sleep medicine for evaluation of sleep disturbance.  I recommend fluticasone 2 sprays each nostril once daily for nasal congestion.    Follow up in about 6 months (around 8/10/2020).

## 2020-02-10 NOTE — PATIENT INSTRUCTIONS
Fluticasone 2 sprays each nostril once daily for nasal congestion.    Hearing has improved with oral steroids. Complete full taper dose.    Referral place to sleep medicine. Please call to schedule ([phone]).    Follow up in 6 months to recheck hearing or sooner if hearing changes.

## 2020-02-10 NOTE — PROGRESS NOTES
Gladis Sesay was seen today in the clinic for an audiologic evaluation.  Patient had a left low frequency hearing loss at her appointment on 2/3/2020.  Mrs. Sesay reported that she has noticed an improvement in her hearing of the left ear since her previous audiogram however the full feeling is still present.    Tympanometry revealed Type A in the right ear and Type As (reduced compliance) in the left ear.  Audiogram results revealed normal hearing (250-8000 Hz).  Speech reception thresholds were noted at 10 dB in the right ear and 10 dB in the left ear.  Speech discrimination scores were 100% in the right ear and 100% in the left ear.    Recommendations:  1. Otologic evaluation  2. Annual audiogram  3. Noise protection when in noise

## 2020-02-23 ENCOUNTER — PATIENT MESSAGE (OUTPATIENT)
Dept: INTERNAL MEDICINE | Facility: CLINIC | Age: 45
End: 2020-02-23

## 2020-03-03 RX ORDER — SPIRONOLACTONE 50 MG/1
50 TABLET, FILM COATED ORAL 2 TIMES DAILY
Qty: 60 TABLET | Refills: 5 | Status: SHIPPED | OUTPATIENT
Start: 2020-03-03 | End: 2021-03-03

## 2020-03-04 ENCOUNTER — PATIENT MESSAGE (OUTPATIENT)
Dept: INTERNAL MEDICINE | Facility: CLINIC | Age: 45
End: 2020-03-04

## 2020-03-26 ENCOUNTER — TELEPHONE (OUTPATIENT)
Dept: CARDIOLOGY | Facility: HOSPITAL | Age: 45
End: 2020-03-26

## 2020-03-26 RX ORDER — METHOCARBAMOL 500 MG/1
500 TABLET, FILM COATED ORAL 4 TIMES DAILY PRN
Qty: 40 TABLET | Refills: 3 | Status: SHIPPED | OUTPATIENT
Start: 2020-03-26 | End: 2020-04-12

## 2020-04-21 DIAGNOSIS — Z01.84 ANTIBODY RESPONSE EXAMINATION: ICD-10-CM

## 2020-05-21 DIAGNOSIS — Z01.84 ANTIBODY RESPONSE EXAMINATION: ICD-10-CM

## 2020-06-20 DIAGNOSIS — Z01.84 ANTIBODY RESPONSE EXAMINATION: ICD-10-CM

## 2020-07-20 DIAGNOSIS — Z01.84 ANTIBODY RESPONSE EXAMINATION: ICD-10-CM

## 2020-08-19 DIAGNOSIS — Z01.84 ANTIBODY RESPONSE EXAMINATION: ICD-10-CM

## 2020-09-18 DIAGNOSIS — Z01.84 ANTIBODY RESPONSE EXAMINATION: ICD-10-CM

## 2020-10-05 ENCOUNTER — PATIENT MESSAGE (OUTPATIENT)
Dept: ADMINISTRATIVE | Facility: HOSPITAL | Age: 45
End: 2020-10-05

## 2020-10-06 ENCOUNTER — PATIENT OUTREACH (OUTPATIENT)
Dept: ADMINISTRATIVE | Facility: OTHER | Age: 45
End: 2020-10-06

## 2020-10-07 NOTE — PROGRESS NOTES
Care Everywhere: updated  Immunization: updated  Health Maintenance: updated  Media Review: review for outside mammogram report   Legacy Review:   Order placed:   Upcoming appts:

## 2020-10-09 ENCOUNTER — OFFICE VISIT (OUTPATIENT)
Dept: OPTOMETRY | Facility: CLINIC | Age: 45
End: 2020-10-09
Payer: COMMERCIAL

## 2020-10-09 DIAGNOSIS — H52.4 MYOPIA WITH PRESBYOPIA OF BOTH EYES: Primary | ICD-10-CM

## 2020-10-09 DIAGNOSIS — H40.1134 PRIMARY OPEN ANGLE GLAUCOMA (POAG) OF BOTH EYES, INDETERMINATE STAGE: ICD-10-CM

## 2020-10-09 DIAGNOSIS — H52.13 MYOPIA WITH PRESBYOPIA OF BOTH EYES: Primary | ICD-10-CM

## 2020-10-09 PROCEDURE — 92020 GONIOSCOPY: CPT | Mod: S$GLB,,, | Performed by: OPTOMETRIST

## 2020-10-09 PROCEDURE — 92004 PR EYE EXAM, NEW PATIENT,COMPREHESV: ICD-10-PCS | Mod: S$GLB,,, | Performed by: OPTOMETRIST

## 2020-10-09 PROCEDURE — 92015 PR REFRACTION: ICD-10-PCS | Mod: S$GLB,,, | Performed by: OPTOMETRIST

## 2020-10-09 PROCEDURE — 92004 COMPRE OPH EXAM NEW PT 1/>: CPT | Mod: S$GLB,,, | Performed by: OPTOMETRIST

## 2020-10-09 PROCEDURE — 92020 PR SPECIAL EYE EVAL,GONIOSCOPY: ICD-10-PCS | Mod: S$GLB,,, | Performed by: OPTOMETRIST

## 2020-10-09 PROCEDURE — 99999 PR PBB SHADOW E&M-EST. PATIENT-LVL III: ICD-10-PCS | Mod: PBBFAC,,, | Performed by: OPTOMETRIST

## 2020-10-09 PROCEDURE — 99999 PR PBB SHADOW E&M-EST. PATIENT-LVL III: CPT | Mod: PBBFAC,,, | Performed by: OPTOMETRIST

## 2020-10-09 PROCEDURE — 92015 DETERMINE REFRACTIVE STATE: CPT | Mod: S$GLB,,, | Performed by: OPTOMETRIST

## 2020-10-09 NOTE — PROGRESS NOTES
[facility] PENA 6 month ago Glaucoma check with Dr. Daniel Castelan. Wants   to move care here.  Patient presents 30+ years of glaucoma, diagnosed at age 13. Recalls last   IOP was less than 15.  Using Xalantan QHS OU, missing 1-2x doses a month. Reports she still has   lots of drops.  Drop history:  Used Timolol for 9-10 years, d/c due to heart block (low HR)  Started Latanoprost afterwards  Family history:  Every member of father's family had POAG    Patient states she was given rx for glasses, however never got them   filled.  Difficulty with near vision.   Pt does not want to be dilated today.     Last edited by Becca Voss, OD on 10/9/2020  9:54 AM. (History)         Myopia with presbyopia of both eyes    Primary open angle glaucoma (POAG) of both eyes, indeterminate stage  -     OCT, Optic Nerve - OU - Both Eyes; Future; Expected date: 11/09/2020  -     [place] Visual Field - OU - Extended - Both Eyes; Future; Expected date: 11/09/2020  -     Posterior Segment OCT Retina-Both eyes; Future; Expected date: 11/09/2020       1. Discussed Presbyopia with patient and need for reading glasses. Recommended +1.00 or +1.25 OTC readers.  Updated glasses prescription given: PALs or Single Vision glasses - patient's choice. Monitor yearly.   Eyeglass Final Rx     Eyeglass Final Rx       Sphere Cylinder Dist VA Add    Right -0.50 Sphere 20/20 +1.25    Left -0.25 Sphere 20/20 +1.25    Type: SVL    Expiration Date: 10/10/2021    Comments: Patient may fill glasses as Single Vision Glasses (near), Bifocals or PALs               2. Patient's POAG is indeterminate stage due to no previous records. Continue current drops: Latanoprost QHS OU for time being  Request records from previous provider.   RTC in 1 month with IOP check, DFE, Pachymetry, HVF 24-2, OCT: RNFL and OCT: Macula   Update drop Rx at this visit.     Addendum:  Previous records obtained. No testing included. Pertinent information:  IOP  08/08/14: 16 //  16  10/01/15: 11 // 12    Pachymetry:   509 // 511    C/D: 0.5 // 0.7 with superior notching OU

## 2020-10-18 DIAGNOSIS — Z01.84 ANTIBODY RESPONSE EXAMINATION: ICD-10-CM

## 2020-10-30 DIAGNOSIS — Z12.31 OTHER SCREENING MAMMOGRAM: ICD-10-CM

## 2020-11-06 ENCOUNTER — TELEPHONE (OUTPATIENT)
Dept: OBSTETRICS AND GYNECOLOGY | Facility: CLINIC | Age: 45
End: 2020-11-06

## 2020-11-06 NOTE — TELEPHONE ENCOUNTER
----- Message from Zahra Morales sent at 11/6/2020 10:21 AM CST -----  Contact: SELF [phone]  Type:  Sooner Apoointment Request    Caller is requesting a sooner appointment.  Caller declined first available appointment listed below.  Caller will not accept being placed on the waitlist and is requesting a message be sent to doctor.  Name of Caller:PT  When is the first available appointment?11/24/20  Symptoms:UTI    Additional Information:

## 2020-11-08 ENCOUNTER — PATIENT OUTREACH (OUTPATIENT)
Dept: ADMINISTRATIVE | Facility: OTHER | Age: 45
End: 2020-11-08

## 2020-11-09 ENCOUNTER — CLINICAL SUPPORT (OUTPATIENT)
Dept: OPHTHALMOLOGY | Facility: CLINIC | Age: 45
End: 2020-11-09
Payer: COMMERCIAL

## 2020-11-09 ENCOUNTER — OFFICE VISIT (OUTPATIENT)
Dept: OPTOMETRY | Facility: CLINIC | Age: 45
End: 2020-11-09
Payer: COMMERCIAL

## 2020-11-09 DIAGNOSIS — H40.023 OAG (OPEN ANGLE GLAUCOMA) SUSPECT, HIGH RISK, BILATERAL: Primary | ICD-10-CM

## 2020-11-09 DIAGNOSIS — H31.001 CHORIORETINAL SCAR OF RIGHT EYE: ICD-10-CM

## 2020-11-09 DIAGNOSIS — H40.1134 PRIMARY OPEN ANGLE GLAUCOMA (POAG) OF BOTH EYES, INDETERMINATE STAGE: ICD-10-CM

## 2020-11-09 PROBLEM — H40.9 GLAUCOMA: Status: RESOLVED | Noted: 2019-08-08 | Resolved: 2020-11-09

## 2020-11-09 PROCEDURE — 92012 PR EYE EXAM, EST PATIENT,INTERMED: ICD-10-PCS | Mod: S$GLB,,, | Performed by: OPTOMETRIST

## 2020-11-09 PROCEDURE — 99999 PR PBB SHADOW E&M-EST. PATIENT-LVL II: CPT | Mod: PBBFAC,,, | Performed by: OPTOMETRIST

## 2020-11-09 PROCEDURE — 99999 PR PBB SHADOW E&M-EST. PATIENT-LVL II: ICD-10-PCS | Mod: PBBFAC,,, | Performed by: OPTOMETRIST

## 2020-11-09 PROCEDURE — 92012 INTRM OPH EXAM EST PATIENT: CPT | Mod: S$GLB,,, | Performed by: OPTOMETRIST

## 2020-11-09 RX ORDER — LATANOPROST 50 UG/ML
1 SOLUTION/ DROPS OPHTHALMIC NIGHTLY
Qty: 2.5 ML | Refills: 11 | Status: SHIPPED | OUTPATIENT
Start: 2020-11-09

## 2020-11-09 NOTE — PROGRESS NOTES
HVF done.rel/fix/coop. good ou. Chart checked and asked patient about latex allergy, pirate patch was used for HVF testing. Rx used: -.50/od -.25/os-smh

## 2020-11-09 NOTE — PROGRESS NOTES
HPI     BECKY:1 month ago, Dr Voss  Pt here for HVF, OCT, DFE for glaucoma    Last edited by Becca Voss, OD on 11/9/2020  5:12 PM. (History)         Assessment & Plan:   1. OAG (open angle glaucoma) suspect, high risk, bilateral  - latanoprost 0.005 % ophthalmic solution; Place 1 drop into both eyes every evening.  Dispense: 2.5 mL; Refill: 11    I do not believe patient had POAG based on the results of today's exam and testing (no glaucomatous damage present on OCT/HVF).   Discussed results with patient. Due to strong FMHx of POAG and patient's age, pt wants to continue using Latanoprost qhs  Okay to monitory yearly with DFE, OCT, HVF 24-2    IOP today: 12 // 12, Pach: 501//501 (thin)  Continue current drops:   Medications Ordered This Encounter   Medications    latanoprost 0.005 % ophthalmic solution     Sig: Place 1 drop into both eyes every evening.     Dispense:  2.5 mL     Refill:  11       Monitor in 1 year with IOP check, DFE, HVF 24-2 and OCT: RNFL       2. Chorioretinal scar of right eye  Monitor yearly

## 2020-11-17 DIAGNOSIS — Z01.84 ANTIBODY RESPONSE EXAMINATION: ICD-10-CM

## 2020-12-15 ENCOUNTER — IMMUNIZATION (OUTPATIENT)
Dept: INTERNAL MEDICINE | Facility: CLINIC | Age: 45
End: 2020-12-15
Payer: COMMERCIAL

## 2020-12-15 ENCOUNTER — TELEPHONE (OUTPATIENT)
Dept: INTERNAL MEDICINE | Facility: CLINIC | Age: 45
End: 2020-12-15

## 2020-12-15 DIAGNOSIS — Z23 NEED FOR VACCINATION: ICD-10-CM

## 2020-12-15 PROCEDURE — 91300 COVID-19, MRNA, LNP-S, PF, 30 MCG/0.3 ML DOSE VACCINE: ICD-10-PCS | Mod: ,,, | Performed by: FAMILY MEDICINE

## 2020-12-15 PROCEDURE — 0001A COVID-19, MRNA, LNP-S, PF, 30 MCG/0.3 ML DOSE VACCINE: CPT | Mod: CV19,,, | Performed by: FAMILY MEDICINE

## 2020-12-15 PROCEDURE — 91300 COVID-19, MRNA, LNP-S, PF, 30 MCG/0.3 ML DOSE VACCINE: CPT | Mod: ,,, | Performed by: FAMILY MEDICINE

## 2020-12-15 PROCEDURE — 0001A COVID-19, MRNA, LNP-S, PF, 30 MCG/0.3 ML DOSE VACCINE: ICD-10-PCS | Mod: CV19,,, | Performed by: FAMILY MEDICINE

## 2020-12-22 ENCOUNTER — PATIENT MESSAGE (OUTPATIENT)
Dept: INTERNAL MEDICINE | Facility: CLINIC | Age: 45
End: 2020-12-22

## 2020-12-23 NOTE — TELEPHONE ENCOUNTER
Pt requesting new med; Eflornithine cream    LOV 08/08/201  Pt has upcoming appt scheduled 01/26/21

## 2021-01-04 ENCOUNTER — PATIENT MESSAGE (OUTPATIENT)
Dept: ADMINISTRATIVE | Facility: HOSPITAL | Age: 46
End: 2021-01-04

## 2021-01-05 ENCOUNTER — IMMUNIZATION (OUTPATIENT)
Dept: INTERNAL MEDICINE | Facility: CLINIC | Age: 46
End: 2021-01-05
Payer: COMMERCIAL

## 2021-01-05 DIAGNOSIS — Z23 NEED FOR VACCINATION: ICD-10-CM

## 2021-01-05 PROCEDURE — 91300 COVID-19, MRNA, LNP-S, PF, 30 MCG/0.3 ML DOSE VACCINE: CPT | Mod: PBBFAC | Performed by: FAMILY MEDICINE

## 2021-01-05 PROCEDURE — 0002A COVID-19, MRNA, LNP-S, PF, 30 MCG/0.3 ML DOSE VACCINE: CPT | Mod: PBBFAC | Performed by: FAMILY MEDICINE

## 2021-01-16 ENCOUNTER — TELEPHONE (OUTPATIENT)
Dept: URGENT CARE | Facility: CLINIC | Age: 46
End: 2021-01-16

## 2021-01-16 RX ORDER — LIDOCAINE HYDROCHLORIDE 20 MG/ML
SOLUTION OROPHARYNGEAL DAILY PRN
Qty: 1 BOTTLE | Refills: 1 | Status: SHIPPED | OUTPATIENT
Start: 2021-01-16

## 2021-01-16 RX ORDER — SUCRALFATE 1 G/10ML
2 SUSPENSION ORAL 4 TIMES DAILY PRN
Qty: 1 BOTTLE | Refills: 1 | Status: SHIPPED | OUTPATIENT
Start: 2021-01-16

## 2021-02-11 ENCOUNTER — TELEPHONE (OUTPATIENT)
Dept: GASTROENTEROLOGY | Facility: CLINIC | Age: 46
End: 2021-02-11

## 2021-02-12 ENCOUNTER — TELEPHONE (OUTPATIENT)
Dept: GASTROENTEROLOGY | Facility: CLINIC | Age: 46
End: 2021-02-12

## 2021-02-12 ENCOUNTER — PATIENT MESSAGE (OUTPATIENT)
Dept: GASTROENTEROLOGY | Facility: CLINIC | Age: 46
End: 2021-02-12

## 2021-04-05 ENCOUNTER — PATIENT MESSAGE (OUTPATIENT)
Dept: ADMINISTRATIVE | Facility: HOSPITAL | Age: 46
End: 2021-04-05

## 2021-07-06 ENCOUNTER — PATIENT MESSAGE (OUTPATIENT)
Dept: ADMINISTRATIVE | Facility: HOSPITAL | Age: 46
End: 2021-07-06

## 2021-10-04 ENCOUNTER — PATIENT MESSAGE (OUTPATIENT)
Dept: ADMINISTRATIVE | Facility: HOSPITAL | Age: 46
End: 2021-10-04

## 2022-01-25 ENCOUNTER — PATIENT MESSAGE (OUTPATIENT)
Dept: ADMINISTRATIVE | Facility: HOSPITAL | Age: 47
End: 2022-01-25
Payer: COMMERCIAL

## 2022-03-16 ENCOUNTER — PATIENT MESSAGE (OUTPATIENT)
Dept: ADMINISTRATIVE | Facility: HOSPITAL | Age: 47
End: 2022-03-16
Payer: COMMERCIAL

## 2022-07-01 ENCOUNTER — PATIENT OUTREACH (OUTPATIENT)
Dept: ADMINISTRATIVE | Facility: HOSPITAL | Age: 47
End: 2022-07-01
Payer: COMMERCIAL

## 2023-01-04 NOTE — PATIENT INSTRUCTIONS
What is Presbyopia?    If you are over 40 years of age, you've probably noticed changes in your vision. Beginning in the early to mid-forties, most adults start to experience problems with their ability to see clearly at close distances, especially for reading and in low light. This is the result of a normal aging change in the eye's focusing ability called presbyopia. Presbyopia will continue to progress over time.   Up until now, your eyes had enough focusing power to allow you to see clearly for close work. Now your eyes no longer have enough focusing power for comfortable near vision tasks.This loss of focusing ability for near vision is the result of the lens inside the eye becoming less flexible. As the lens looses it's flexibility, we become 'stuck' without our distance vision. Presbyopia can't be prevented or cured, but many options are available to help compensate for the loss of near focusing ability. Most individuals should be able to obtain clear, comfortable near vision for all of their lifestyle needs.         Treatment:  Fortunately, people with presbyopia now have many options to improve their ability to see well up close.    Eyeglasses, including single vision reading glasses and multifocal lenses    There are options both with a line (called bifocals) and without a line (called progressives)   Contact lenses, including monovision and multifocal lenses     As you continue to age through your 50s and beyond, presbyopia becomes more advanced. You may notice the need for more frequent changes in eyeglass or contact lens prescriptions. Around age 60, these changes in near vision should stop and prescription changes should occur less frequently.    Adapted from The [employer]  Further Reading: [employer]     Yes